# Patient Record
Sex: FEMALE | Race: WHITE | Employment: OTHER | URBAN - METROPOLITAN AREA
[De-identification: names, ages, dates, MRNs, and addresses within clinical notes are randomized per-mention and may not be internally consistent; named-entity substitution may affect disease eponyms.]

---

## 2019-06-05 ENCOUNTER — APPOINTMENT (OUTPATIENT)
Dept: GENERAL RADIOLOGY | Age: 84
End: 2019-06-05
Attending: EMERGENCY MEDICINE
Payer: MEDICARE

## 2019-06-05 ENCOUNTER — HOSPITAL ENCOUNTER (EMERGENCY)
Age: 84
Discharge: HOME | End: 2019-06-05
Attending: EMERGENCY MEDICINE
Payer: MEDICARE

## 2019-06-05 VITALS
HEIGHT: 68 IN | OXYGEN SATURATION: 95 % | HEART RATE: 69 BPM | DIASTOLIC BLOOD PRESSURE: 90 MMHG | SYSTOLIC BLOOD PRESSURE: 178 MMHG | BODY MASS INDEX: 24.25 KG/M2 | WEIGHT: 160 LBS | RESPIRATION RATE: 16 BRPM | TEMPERATURE: 98.7 F

## 2019-06-05 DIAGNOSIS — L03.116 LEFT LEG CELLULITIS: Primary | ICD-10-CM

## 2019-06-05 LAB
ANION GAP SERPL CALC-SCNC: 3 MMOL/L (ref 5–15)
BASOPHILS # BLD: 0.1 K/UL (ref 0–0.1)
BASOPHILS NFR BLD: 1 % (ref 0–1)
BUN SERPL-MCNC: 17 MG/DL (ref 6–20)
BUN/CREAT SERPL: 12 (ref 12–20)
CALCIUM SERPL-MCNC: 8.8 MG/DL (ref 8.5–10.1)
CHLORIDE SERPL-SCNC: 99 MMOL/L (ref 97–108)
CO2 SERPL-SCNC: 31 MMOL/L (ref 21–32)
CREAT SERPL-MCNC: 1.39 MG/DL (ref 0.55–1.02)
DIFFERENTIAL METHOD BLD: NORMAL
EOSINOPHIL # BLD: 0.2 K/UL (ref 0–0.4)
EOSINOPHIL NFR BLD: 3 % (ref 0–7)
ERYTHROCYTE [DISTWIDTH] IN BLOOD BY AUTOMATED COUNT: 12.5 % (ref 11.5–14.5)
GLUCOSE SERPL-MCNC: 91 MG/DL (ref 65–100)
HCT VFR BLD AUTO: 36.7 % (ref 35–47)
HGB BLD-MCNC: 11.9 G/DL (ref 11.5–16)
IMM GRANULOCYTES # BLD AUTO: 0 K/UL (ref 0–0.04)
IMM GRANULOCYTES NFR BLD AUTO: 0 % (ref 0–0.5)
LACTATE BLD-SCNC: 0.59 MMOL/L (ref 0.4–2)
LYMPHOCYTES # BLD: 1.4 K/UL (ref 0.8–3.5)
LYMPHOCYTES NFR BLD: 24 % (ref 12–49)
MCH RBC QN AUTO: 30.3 PG (ref 26–34)
MCHC RBC AUTO-ENTMCNC: 32.4 G/DL (ref 30–36.5)
MCV RBC AUTO: 93.4 FL (ref 80–99)
MONOCYTES # BLD: 0.6 K/UL (ref 0–1)
MONOCYTES NFR BLD: 10 % (ref 5–13)
NEUTS SEG # BLD: 3.5 K/UL (ref 1.8–8)
NEUTS SEG NFR BLD: 62 % (ref 32–75)
NRBC # BLD: 0 K/UL (ref 0–0.01)
NRBC BLD-RTO: 0 PER 100 WBC
PLATELET # BLD AUTO: 226 K/UL (ref 150–400)
PMV BLD AUTO: 9.8 FL (ref 8.9–12.9)
POTASSIUM SERPL-SCNC: 4.1 MMOL/L (ref 3.5–5.1)
RBC # BLD AUTO: 3.93 M/UL (ref 3.8–5.2)
SODIUM SERPL-SCNC: 133 MMOL/L (ref 136–145)
WBC # BLD AUTO: 5.6 K/UL (ref 3.6–11)

## 2019-06-05 PROCEDURE — 96365 THER/PROPH/DIAG IV INF INIT: CPT

## 2019-06-05 PROCEDURE — 80048 BASIC METABOLIC PNL TOTAL CA: CPT

## 2019-06-05 PROCEDURE — 74011250636 HC RX REV CODE- 250/636: Performed by: EMERGENCY MEDICINE

## 2019-06-05 PROCEDURE — 99282 EMERGENCY DEPT VISIT SF MDM: CPT

## 2019-06-05 PROCEDURE — 36415 COLL VENOUS BLD VENIPUNCTURE: CPT

## 2019-06-05 PROCEDURE — 85025 COMPLETE CBC W/AUTO DIFF WBC: CPT

## 2019-06-05 PROCEDURE — 73610 X-RAY EXAM OF ANKLE: CPT

## 2019-06-05 PROCEDURE — 74011250637 HC RX REV CODE- 250/637: Performed by: EMERGENCY MEDICINE

## 2019-06-05 PROCEDURE — 83605 ASSAY OF LACTIC ACID: CPT

## 2019-06-05 RX ORDER — CLINDAMYCIN PHOSPHATE 900 MG/50ML
900 INJECTION, SOLUTION INTRAVENOUS
Status: COMPLETED | OUTPATIENT
Start: 2019-06-05 | End: 2019-06-05

## 2019-06-05 RX ORDER — SODIUM CHLORIDE 0.9 % (FLUSH) 0.9 %
5-40 SYRINGE (ML) INJECTION EVERY 8 HOURS
Status: DISCONTINUED | OUTPATIENT
Start: 2019-06-05 | End: 2019-06-05 | Stop reason: HOSPADM

## 2019-06-05 RX ORDER — CLINDAMYCIN HYDROCHLORIDE 300 MG/1
300 CAPSULE ORAL 3 TIMES DAILY
Qty: 21 CAP | Refills: 0 | Status: SHIPPED | OUTPATIENT
Start: 2019-06-05 | End: 2019-06-12

## 2019-06-05 RX ORDER — ACETAMINOPHEN 500 MG
1000 TABLET ORAL
Status: COMPLETED | OUTPATIENT
Start: 2019-06-05 | End: 2019-06-05

## 2019-06-05 RX ORDER — SODIUM CHLORIDE 0.9 % (FLUSH) 0.9 %
5-40 SYRINGE (ML) INJECTION AS NEEDED
Status: DISCONTINUED | OUTPATIENT
Start: 2019-06-05 | End: 2019-06-05 | Stop reason: HOSPADM

## 2019-06-05 RX ADMIN — ACETAMINOPHEN 1000 MG: 500 TABLET ORAL at 16:43

## 2019-06-05 RX ADMIN — CLINDAMYCIN PHOSPHATE 900 MG: 900 INJECTION, SOLUTION INTRAVENOUS at 16:30

## 2019-06-05 NOTE — ED TRIAGE NOTES
Family reports pt had a cut to left ankle May 20th. Unsure of how the injury happened. Increased pain and redness to foot and leg. On Doxycycline since Tuesday.

## 2019-06-05 NOTE — DISCHARGE INSTRUCTIONS

## 2019-06-05 NOTE — ED PROVIDER NOTES
80 y.o. female with past medical history significant for HTN, and h./o breast CA who presents via private vehicle with chief complaint of left leg pain. Daughter reports that on May 20th (about 16 days ago) pt had an abrasion on her left leg which they cleaned up. Pt was taken to Urgent care immediately, had an unremarkable Xray and was started on prophylactic antibiotics for two days. Then daughter notes pt had a PCP appointment on May 29th (about a week ago) as pt had some redness and swelling by the area of abrasion. Since then pt would elevate her legs and took care of her wound. Two days ago pt started complaining about left leg pain, and daughter reports pt was seen by her PCP again. Pt was started on doxycycline and has taken the medication in the last 1.5 days. Pt states she presents to the ED for continuing left leg pain. Daughter reports pt has a h/o cellulitis. Pt notes she is not UTD on her tetanus vaccination. Pt denies fever, nausea, vomiting, or body aches. There are no other acute medical concerns at this time. Social hx: Former Smoker; Occasional EtOH  PCP: Other, MD Kecia    Note written by Natty Sampson, as dictated by Lawanda Nice MD 3:33 PM      The history is provided by the patient and a relative (daughter). Past Medical History:   Diagnosis Date    Cancer [facility])     beast CA    Endocrine disease     hypothyroid    Hypertension        Past Surgical History:   Procedure Laterality Date    BREAST SURGERY PROCEDURE UNLISTED      right mastectomy         No family history on file.     Social History     Socioeconomic History    Marital status: SINGLE     Spouse name: Not on file    Number of children: Not on file    Years of education: Not on file    Highest education level: Not on file   Occupational History    Not on file   Social Needs    Financial resource strain: Not on file    Food insecurity:     Worry: Not on file     Inability: Not on file   [place] needs: Medical: Not on file     Non-medical: Not on file   Tobacco Use    Smoking status: Former Smoker   Substance and Sexual Activity    Alcohol use: Yes    Drug use: Not on file    Sexual activity: Not on file   Lifestyle    Physical activity:     Days per week: Not on file     Minutes per session: Not on file    Stress: Not on file   Relationships    Social connections:     Talks on phone: Not on file     Gets together: Not on file     Attends Moravian service: Not on file     Active member of club or organization: Not on file     Attends meetings of clubs or organizations: Not on file     Relationship status: Not on file    Intimate partner violence:     Fear of current or ex partner: Not on file     Emotionally abused: Not on file     Physically abused: Not on file     Forced sexual activity: Not on file   Other Topics Concern    Not on file   Social History Narrative    Not on file         ALLERGIES: Sulfa (sulfonamide antibiotics) and Neosporin [hydrocortisone]    Review of Systems   Constitutional: Negative for fever. Gastrointestinal: Negative for nausea and vomiting. Musculoskeletal: Positive for myalgias. Skin: Positive for wound. All other systems reviewed and are negative. Vitals:    06/05/19 1528   BP: 178/90   Pulse: 69   Resp: 16   Temp: 98.7 °F (37.1 °C)   SpO2: 95%   Weight: 72.6 kg (160 lb)   Height: 5' 8\" (1.727 m)            Physical Exam   Constitutional: She appears well-developed and well-nourished. HENT:   Head: Normocephalic and atraumatic. Eyes: Conjunctivae are normal.   Neck: No tracheal deviation present. Cardiovascular: Normal rate. Pulmonary/Chest: Effort normal.   Abdominal: She exhibits no distension. Musculoskeletal:   Deep, slowly healing abrasion noted left lateral ankle region; there is some mild surrounding redness that extends somewhat up to her mid lower leg; the reddened area is tender. NVI. Neurological: She is alert.    Skin: Skin is dry. Psychiatric: She has a normal mood and affect. Nursing note and vitals reviewed. MDM       Procedures    Progress Note:  Results, treatment, and follow up plan have been discussed with patient/daughter. Questions were answered. Maria Guadalupe Jean MD  4:58 PM    A/P: left lower leg cellulitis - she's had a few doses of Doxy without relief; will switch to Clinda; reassuring appearance and exam; VSS; CBC, BMP, lactate, left ankle films ok; home with PCP f/u.   Maria Guadalupe Jean MD  4:59 PM

## 2020-07-17 ENCOUNTER — APPOINTMENT (OUTPATIENT)
Dept: ULTRASOUND IMAGING | Age: 85
DRG: 603 | End: 2020-07-17
Attending: STUDENT IN AN ORGANIZED HEALTH CARE EDUCATION/TRAINING PROGRAM
Payer: MEDICARE

## 2020-07-17 ENCOUNTER — HOSPITAL ENCOUNTER (INPATIENT)
Age: 85
LOS: 5 days | Discharge: HOME HEALTH | DRG: 603 | End: 2020-07-22
Attending: STUDENT IN AN ORGANIZED HEALTH CARE EDUCATION/TRAINING PROGRAM | Admitting: FAMILY MEDICINE
Payer: MEDICARE

## 2020-07-17 DIAGNOSIS — L03.115 CELLULITIS OF RIGHT LOWER EXTREMITY: Primary | ICD-10-CM

## 2020-07-17 PROBLEM — L03.90 CELLULITIS: Status: ACTIVE | Noted: 2020-07-17

## 2020-07-17 LAB
ALBUMIN SERPL-MCNC: 3.1 G/DL (ref 3.5–5)
ALBUMIN/GLOB SERPL: 0.9 {RATIO} (ref 1.1–2.2)
ALP SERPL-CCNC: 61 U/L (ref 45–117)
ALT SERPL-CCNC: 20 U/L (ref 12–78)
ANION GAP SERPL CALC-SCNC: 7 MMOL/L (ref 5–15)
AST SERPL-CCNC: 17 U/L (ref 15–37)
BASOPHILS # BLD: 0 K/UL (ref 0–0.1)
BASOPHILS NFR BLD: 1 % (ref 0–1)
BILIRUB SERPL-MCNC: 0.5 MG/DL (ref 0.2–1)
BUN SERPL-MCNC: 24 MG/DL (ref 6–20)
BUN/CREAT SERPL: 21 (ref 12–20)
CALCIUM SERPL-MCNC: 8.5 MG/DL (ref 8.5–10.1)
CHLORIDE SERPL-SCNC: 96 MMOL/L (ref 97–108)
CO2 SERPL-SCNC: 29 MMOL/L (ref 21–32)
CREAT SERPL-MCNC: 1.15 MG/DL (ref 0.55–1.02)
CRP SERPL-MCNC: 17.78 MG/DL (ref 0–0.6)
DIFFERENTIAL METHOD BLD: ABNORMAL
EOSINOPHIL # BLD: 0.4 K/UL (ref 0–0.4)
EOSINOPHIL NFR BLD: 5 % (ref 0–7)
ERYTHROCYTE [DISTWIDTH] IN BLOOD BY AUTOMATED COUNT: 12.7 % (ref 11.5–14.5)
ERYTHROCYTE [SEDIMENTATION RATE] IN BLOOD: 43 MM/HR (ref 0–30)
GLOBULIN SER CALC-MCNC: 3.4 G/DL (ref 2–4)
GLUCOSE SERPL-MCNC: 95 MG/DL (ref 65–100)
HCT VFR BLD AUTO: 35 % (ref 35–47)
HGB BLD-MCNC: 11.2 G/DL (ref 11.5–16)
IMM GRANULOCYTES # BLD AUTO: 0 K/UL (ref 0–0.04)
IMM GRANULOCYTES NFR BLD AUTO: 1 % (ref 0–0.5)
LYMPHOCYTES # BLD: 0.9 K/UL (ref 0.8–3.5)
LYMPHOCYTES NFR BLD: 11 % (ref 12–49)
MCH RBC QN AUTO: 29.6 PG (ref 26–34)
MCHC RBC AUTO-ENTMCNC: 32 G/DL (ref 30–36.5)
MCV RBC AUTO: 92.3 FL (ref 80–99)
MONOCYTES # BLD: 0.8 K/UL (ref 0–1)
MONOCYTES NFR BLD: 10 % (ref 5–13)
NEUTS SEG # BLD: 6.2 K/UL (ref 1.8–8)
NEUTS SEG NFR BLD: 74 % (ref 32–75)
NRBC # BLD: 0 K/UL (ref 0–0.01)
NRBC BLD-RTO: 0 PER 100 WBC
PLATELET # BLD AUTO: 215 K/UL (ref 150–400)
PMV BLD AUTO: 9.8 FL (ref 8.9–12.9)
POTASSIUM SERPL-SCNC: 4 MMOL/L (ref 3.5–5.1)
PROCALCITONIN SERPL-MCNC: 0.55 NG/ML
PROT SERPL-MCNC: 6.5 G/DL (ref 6.4–8.2)
RBC # BLD AUTO: 3.79 M/UL (ref 3.8–5.2)
SODIUM SERPL-SCNC: 132 MMOL/L (ref 136–145)
WBC # BLD AUTO: 8.4 K/UL (ref 3.6–11)

## 2020-07-17 PROCEDURE — 36415 COLL VENOUS BLD VENIPUNCTURE: CPT

## 2020-07-17 PROCEDURE — 65270000029 HC RM PRIVATE

## 2020-07-17 PROCEDURE — 84145 PROCALCITONIN (PCT): CPT

## 2020-07-17 PROCEDURE — 85652 RBC SED RATE AUTOMATED: CPT

## 2020-07-17 PROCEDURE — 86140 C-REACTIVE PROTEIN: CPT

## 2020-07-17 PROCEDURE — 74011000258 HC RX REV CODE- 258: Performed by: STUDENT IN AN ORGANIZED HEALTH CARE EDUCATION/TRAINING PROGRAM

## 2020-07-17 PROCEDURE — 96368 THER/DIAG CONCURRENT INF: CPT

## 2020-07-17 PROCEDURE — 93971 EXTREMITY STUDY: CPT

## 2020-07-17 PROCEDURE — 85025 COMPLETE CBC W/AUTO DIFF WBC: CPT

## 2020-07-17 PROCEDURE — 96365 THER/PROPH/DIAG IV INF INIT: CPT

## 2020-07-17 PROCEDURE — 99285 EMERGENCY DEPT VISIT HI MDM: CPT

## 2020-07-17 PROCEDURE — 80053 COMPREHEN METABOLIC PANEL: CPT

## 2020-07-17 PROCEDURE — 74011250636 HC RX REV CODE- 250/636: Performed by: STUDENT IN AN ORGANIZED HEALTH CARE EDUCATION/TRAINING PROGRAM

## 2020-07-17 RX ORDER — VANCOMYCIN/0.9 % SOD CHLORIDE 1.5G/250ML
1500 PLASTIC BAG, INJECTION (ML) INTRAVENOUS ONCE
Status: DISCONTINUED | OUTPATIENT
Start: 2020-07-17 | End: 2020-07-17 | Stop reason: SDUPTHER

## 2020-07-17 RX ORDER — GLUCOSAMINE SULFATE 1500 MG
POWDER IN PACKET (EA) ORAL DAILY
COMMUNITY

## 2020-07-17 RX ORDER — METOPROLOL TARTRATE 25 MG/1
25 TABLET, FILM COATED ORAL 2 TIMES DAILY
COMMUNITY

## 2020-07-17 RX ORDER — SODIUM CHLORIDE 0.9 % (FLUSH) 0.9 %
5-40 SYRINGE (ML) INJECTION EVERY 8 HOURS
Status: DISCONTINUED | OUTPATIENT
Start: 2020-07-17 | End: 2020-07-22 | Stop reason: HOSPADM

## 2020-07-17 RX ORDER — SODIUM CHLORIDE 0.9 % (FLUSH) 0.9 %
5-40 SYRINGE (ML) INJECTION AS NEEDED
Status: DISCONTINUED | OUTPATIENT
Start: 2020-07-17 | End: 2020-07-22 | Stop reason: HOSPADM

## 2020-07-17 RX ORDER — RISPERIDONE 0.25 MG/1
0.25 TABLET, FILM COATED ORAL 2 TIMES DAILY
COMMUNITY

## 2020-07-17 RX ORDER — ESCITALOPRAM OXALATE 20 MG/1
20 TABLET ORAL DAILY
COMMUNITY
End: 2021-03-15

## 2020-07-17 RX ADMIN — VANCOMYCIN HYDROCHLORIDE 500 MG: 500 INJECTION, POWDER, LYOPHILIZED, FOR SOLUTION INTRAVENOUS at 19:08

## 2020-07-17 RX ADMIN — VANCOMYCIN HYDROCHLORIDE 1000 MG: 1 INJECTION, POWDER, LYOPHILIZED, FOR SOLUTION INTRAVENOUS at 19:09

## 2020-07-17 RX ADMIN — Medication 10 ML: at 22:00

## 2020-07-17 NOTE — ED PROVIDER NOTES
Cate Wall is a 80 y.o. female with past medical history notable for breast cancer, hypothyroidism, hypertension presenting with persistent erythema, warmth and swelling of the left lower extremity despite multiple courses of antibiotics. 5/27/2020 she started Keflex 500 mg tabs for a total of 7 days, on 6/5/2020 and she started doxycycline 100 mg twice daily for 7 days. She started a prednisone taper on 6/23/2020. Despite this she has had persistent swelling in lower extremity. She also has some discomfort although does not severe. She denies abdominal pain, vomiting, groin pain, subjective fever. She was transferred from her nursing facility for evaluation and possible admission. Past Medical History:   Diagnosis Date    Cancer [facility])     beast CA    Endocrine disease     hypothyroid    Hypertension        Past Surgical History:   Procedure Laterality Date    BREAST SURGERY PROCEDURE UNLISTED      right mastectomy         History reviewed. No pertinent family history.     Social History     Socioeconomic History    Marital status: SINGLE     Spouse name: Not on file    Number of children: Not on file    Years of education: Not on file    Highest education level: Not on file   Occupational History    Not on file   Social Needs    Financial resource strain: Not on file    Food insecurity     Worry: Not on file     Inability: Not on file    Transportation needs     Medical: Not on file     Non-medical: Not on file   Tobacco Use    Smoking status: Former Smoker   Substance and Sexual Activity    Alcohol use: Not Currently    Drug use: Never    Sexual activity: Not on file   Lifestyle    Physical activity     Days per week: Not on file     Minutes per session: Not on file    Stress: Not on file   Relationships    Social connections     Talks on phone: Not on file     Gets together: Not on file     Attends Zoroastrianism service: Not on file     Active member of club or organization: Not on file     Attends meetings of clubs or organizations: Not on file     Relationship status: Not on file    Intimate partner violence     Fear of current or ex partner: Not on file     Emotionally abused: Not on file     Physically abused: Not on file     Forced sexual activity: Not on file   Other Topics Concern    Not on file   Social History Narrative    Not on file         ALLERGIES: Sulfa (sulfonamide antibiotics) and Neosporin [hydrocortisone]    Review of Systems   Constitutional: Negative for chills and fever. Eyes: Negative for photophobia. Respiratory: Negative for cough and shortness of breath. Cardiovascular: Positive for leg swelling. Negative for chest pain. Gastrointestinal: Negative for abdominal pain and nausea. Genitourinary: Negative for dysuria. Musculoskeletal: Negative for back pain. Skin: Positive for rash. Neurological: Negative for light-headedness and headaches. Psychiatric/Behavioral: Negative for confusion. All other systems reviewed and are negative. Vitals:    07/17/20 1801 07/17/20 1815 07/17/20 1830 07/17/20 1831   BP:  132/63     Resp:       Temp: 98.5 °F (36.9 °C)      SpO2:  97%     Weight:   72.6 kg (160 lb 0.9 oz)    Height:   5' 3\" (1.6 m) 5' 3\" (1.6 m)            Physical Exam  Vitals signs reviewed. Constitutional:       General: She is not in acute distress. Appearance: She is not toxic-appearing. HENT:      Head: Normocephalic and atraumatic. Mouth/Throat:      Mouth: Mucous membranes are moist.      Pharynx: Oropharynx is clear. Cardiovascular:      Rate and Rhythm: Normal rate and regular rhythm. Heart sounds: Normal heart sounds. Pulmonary:      Effort: Pulmonary effort is normal.      Breath sounds: Normal breath sounds. Abdominal:      Tenderness: There is no abdominal tenderness. There is no guarding or rebound. Musculoskeletal: Normal range of motion. Right lower leg: Edema present.       Comments: 2+ pedal pulses bilaterally, cap refill less than 2 bilateral lower extremities. Skin:     Capillary Refill: Capillary refill takes less than 2 seconds. Findings: Erythema present. Neurological:      General: No focal deficit present. Mental Status: She is alert and oriented to person, place, and time. Psychiatric:         Mood and Affect: Mood normal.          MDM  Number of Diagnoses or Management Options  Cellulitis of right lower extremity:        Chief Complaint   Patient presents with    Skin Problem     Hospitalist Jeancarlos Payne for Admission  7:24 PM    ED Room Number: WER04/04  Patient Name and age:  Jose Setting 80 y.o.  female  Working Diagnosis:   1. Cellulitis of right lower extremity        COVID-19 Suspicion:  no    Code Status:  No code status specified but  Power of  documents are included. Readmission: no  Isolation Requirements:  no  Recommended Level of Care:  med/surg  Department:[place] [facility] - [phone]  Other:      Jose Pedroza is a 80 y.o. female presenting with leg swelling, discomfort. Differential includes cellulitis, less likely venous stasis or DVT. No evidence of sepsis, inflammatory markers are however elevated. Will start on IV vancomycin, plan to admit given multiple courses of oral antibiotics without improvement.   MEDICATIONS GIVEN:  Medications   vancomycin (VANCOCIN) 1,000 mg in 0.9% sodium chloride (MBP/ADV) 250 mL (1,000 mg IntraVENous New Bag 7/17/20 1909)     And   vancomycin (VANCOCIN) 500 mg in 0.9% sodium chloride (MBP/ADV) 100 mL (500 mg IntraVENous New Bag 7/17/20 1908)       LABS REVIEWED:  Labs Reviewed   CBC WITH AUTOMATED DIFF - Abnormal; Notable for the following components:       Result Value    RBC 3.79 (*)     HGB 11.2 (*)     LYMPHOCYTES 11 (*)     IMMATURE GRANULOCYTES 1 (*)     All other components within normal limits   METABOLIC PANEL, COMPREHENSIVE - Abnormal; Notable for the following components:    Sodium 132 (*) Chloride 96 (*)     BUN 24 (*)     Creatinine 1.15 (*)     BUN/Creatinine ratio 21 (*)     GFR est AA 54 (*)     GFR est non-AA 45 (*)     Albumin 3.1 (*)     A-G Ratio 0.9 (*)     All other components within normal limits   SED RATE (ESR) - Abnormal; Notable for the following components:    Sed rate, automated 43 (*)     All other components within normal limits   C REACTIVE PROTEIN, QT - Abnormal; Notable for the following components:    C-Reactive protein 17.78 (*)     All other components within normal limits   PROCALCITONIN       VITAL SIGNS:  Vitals:    07/17/20 1801 07/17/20 1815 07/17/20 1830 07/17/20 1831   BP:  132/63     Resp:       Temp: 98.5 °F (36.9 °C)      SpO2:  97%     Weight:   72.6 kg (160 lb 0.9 oz)    Height:   5' 3\" (1.6 m) 5' 3\" (1.6 m)        RADIOLOGY RESULTS:  No orders to display       CLINICAL IMPRESSION:  1.  Cellulitis of right lower extremity        DISPO:  Admitted    FOLLOW UP PLAN:  Follow-up Information    None          ED COURSE:         Procedures

## 2020-07-17 NOTE — ED TRIAGE NOTES
Pt arrives via [facility]. Report of \"pt resides at the [facility] staff concerned about possible cellulitis right leg. \" Pt appears in no distress at this time.

## 2020-07-17 NOTE — ED NOTES
Patient is resting comfortably on the stretcher, covered with blankets. IV Vancomycin running per IV pumps, and tolerating well. Awaiting bed assignment at [facility].

## 2020-07-17 NOTE — ED NOTES
Son is [place], [place], he wants to be called from the admitting nurse at [facility] when patient arrives to assigned room.

## 2020-07-17 NOTE — ED NOTES
Pts son updated on pts statis and current plan to admit to [facility]. Pts son verbalized good understanding and agreement with plan.

## 2020-07-17 NOTE — ED NOTES
Bedside shift change report given to Rebekah Tamayo RN (oncoming nurse) by Roseline Hilliard. Salome Arroyo RN (offgoing nurse). Report included the following information SBAR.

## 2020-07-18 LAB
ALBUMIN SERPL-MCNC: 2.5 G/DL (ref 3.5–5)
ALBUMIN/GLOB SERPL: 0.9 {RATIO} (ref 1.1–2.2)
ALP SERPL-CCNC: 51 U/L (ref 45–117)
ALT SERPL-CCNC: 14 U/L (ref 12–78)
ANION GAP SERPL CALC-SCNC: 5 MMOL/L (ref 5–15)
AST SERPL-CCNC: 11 U/L (ref 15–37)
BILIRUB SERPL-MCNC: 0.6 MG/DL (ref 0.2–1)
BUN SERPL-MCNC: 18 MG/DL (ref 6–20)
BUN/CREAT SERPL: 23 (ref 12–20)
CALCIUM SERPL-MCNC: 7.7 MG/DL (ref 8.5–10.1)
CHLORIDE SERPL-SCNC: 102 MMOL/L (ref 97–108)
CO2 SERPL-SCNC: 27 MMOL/L (ref 21–32)
COMMENT, HOLDF: NORMAL
CREAT SERPL-MCNC: 0.8 MG/DL (ref 0.55–1.02)
ERYTHROCYTE [DISTWIDTH] IN BLOOD BY AUTOMATED COUNT: 12.6 % (ref 11.5–14.5)
GLOBULIN SER CALC-MCNC: 2.8 G/DL (ref 2–4)
GLUCOSE SERPL-MCNC: 88 MG/DL (ref 65–100)
HCT VFR BLD AUTO: 31.2 % (ref 35–47)
HGB BLD-MCNC: 10.1 G/DL (ref 11.5–16)
MCH RBC QN AUTO: 29.6 PG (ref 26–34)
MCHC RBC AUTO-ENTMCNC: 32.4 G/DL (ref 30–36.5)
MCV RBC AUTO: 91.5 FL (ref 80–99)
NRBC # BLD: 0 K/UL (ref 0–0.01)
NRBC BLD-RTO: 0 PER 100 WBC
PLATELET # BLD AUTO: 182 K/UL (ref 150–400)
PMV BLD AUTO: 9.4 FL (ref 8.9–12.9)
POTASSIUM SERPL-SCNC: 3.8 MMOL/L (ref 3.5–5.1)
PROT SERPL-MCNC: 5.3 G/DL (ref 6.4–8.2)
RBC # BLD AUTO: 3.41 M/UL (ref 3.8–5.2)
SAMPLES BEING HELD,HOLD: NORMAL
SODIUM SERPL-SCNC: 134 MMOL/L (ref 136–145)
WBC # BLD AUTO: 5.6 K/UL (ref 3.6–11)

## 2020-07-18 PROCEDURE — 80053 COMPREHEN METABOLIC PANEL: CPT

## 2020-07-18 PROCEDURE — 74011250636 HC RX REV CODE- 250/636: Performed by: INTERNAL MEDICINE

## 2020-07-18 PROCEDURE — 36415 COLL VENOUS BLD VENIPUNCTURE: CPT

## 2020-07-18 PROCEDURE — 74011000258 HC RX REV CODE- 258: Performed by: INTERNAL MEDICINE

## 2020-07-18 PROCEDURE — 65270000029 HC RM PRIVATE

## 2020-07-18 PROCEDURE — 74011250636 HC RX REV CODE- 250/636: Performed by: FAMILY MEDICINE

## 2020-07-18 PROCEDURE — 77030038269 HC DRN EXT URIN PURWCK BARD -A

## 2020-07-18 PROCEDURE — 74011250637 HC RX REV CODE- 250/637: Performed by: FAMILY MEDICINE

## 2020-07-18 PROCEDURE — 85027 COMPLETE CBC AUTOMATED: CPT

## 2020-07-18 RX ORDER — SODIUM CHLORIDE 9 MG/ML
75 INJECTION, SOLUTION INTRAVENOUS CONTINUOUS
Status: DISCONTINUED | OUTPATIENT
Start: 2020-07-18 | End: 2020-07-19

## 2020-07-18 RX ORDER — SODIUM CHLORIDE 0.9 % (FLUSH) 0.9 %
5-40 SYRINGE (ML) INJECTION EVERY 8 HOURS
Status: DISCONTINUED | OUTPATIENT
Start: 2020-07-18 | End: 2020-07-22 | Stop reason: HOSPADM

## 2020-07-18 RX ORDER — ACETAMINOPHEN 650 MG/1
650 SUPPOSITORY RECTAL
Status: DISCONTINUED | OUTPATIENT
Start: 2020-07-18 | End: 2020-07-22 | Stop reason: HOSPADM

## 2020-07-18 RX ORDER — POLYETHYLENE GLYCOL 3350 17 G/17G
17 POWDER, FOR SOLUTION ORAL DAILY PRN
Status: DISCONTINUED | OUTPATIENT
Start: 2020-07-18 | End: 2020-07-22 | Stop reason: HOSPADM

## 2020-07-18 RX ORDER — METOPROLOL TARTRATE 25 MG/1
25 TABLET, FILM COATED ORAL 2 TIMES DAILY
Status: DISCONTINUED | OUTPATIENT
Start: 2020-07-18 | End: 2020-07-22 | Stop reason: HOSPADM

## 2020-07-18 RX ORDER — SODIUM CHLORIDE 0.9 % (FLUSH) 0.9 %
5-40 SYRINGE (ML) INJECTION AS NEEDED
Status: DISCONTINUED | OUTPATIENT
Start: 2020-07-18 | End: 2020-07-22 | Stop reason: HOSPADM

## 2020-07-18 RX ORDER — ENOXAPARIN SODIUM 100 MG/ML
40 INJECTION SUBCUTANEOUS DAILY
Status: DISCONTINUED | OUTPATIENT
Start: 2020-07-18 | End: 2020-07-22 | Stop reason: HOSPADM

## 2020-07-18 RX ORDER — ONDANSETRON 2 MG/ML
4 INJECTION INTRAMUSCULAR; INTRAVENOUS
Status: DISCONTINUED | OUTPATIENT
Start: 2020-07-18 | End: 2020-07-22 | Stop reason: HOSPADM

## 2020-07-18 RX ORDER — ESCITALOPRAM OXALATE 10 MG/1
20 TABLET ORAL DAILY
Status: DISCONTINUED | OUTPATIENT
Start: 2020-07-18 | End: 2020-07-22 | Stop reason: HOSPADM

## 2020-07-18 RX ORDER — PROMETHAZINE HYDROCHLORIDE 25 MG/1
12.5 TABLET ORAL
Status: DISCONTINUED | OUTPATIENT
Start: 2020-07-18 | End: 2020-07-22 | Stop reason: HOSPADM

## 2020-07-18 RX ORDER — ACETAMINOPHEN 325 MG/1
650 TABLET ORAL
Status: DISCONTINUED | OUTPATIENT
Start: 2020-07-18 | End: 2020-07-22 | Stop reason: HOSPADM

## 2020-07-18 RX ORDER — LEVOTHYROXINE SODIUM 50 UG/1
50 TABLET ORAL
Status: DISCONTINUED | OUTPATIENT
Start: 2020-07-18 | End: 2020-07-22 | Stop reason: HOSPADM

## 2020-07-18 RX ORDER — AMLODIPINE BESYLATE 5 MG/1
5 TABLET ORAL DAILY
Status: DISCONTINUED | OUTPATIENT
Start: 2020-07-18 | End: 2020-07-22 | Stop reason: HOSPADM

## 2020-07-18 RX ADMIN — SODIUM CHLORIDE 75 ML/HR: 900 INJECTION, SOLUTION INTRAVENOUS at 02:29

## 2020-07-18 RX ADMIN — LEVOTHYROXINE SODIUM 50 MCG: 0.05 TABLET ORAL at 06:18

## 2020-07-18 RX ADMIN — CEFEPIME HYDROCHLORIDE 2 G: 2 INJECTION, POWDER, FOR SOLUTION INTRAVENOUS at 22:07

## 2020-07-18 RX ADMIN — METOPROLOL TARTRATE 25 MG: 25 TABLET, FILM COATED ORAL at 09:11

## 2020-07-18 RX ADMIN — Medication 10 ML: at 14:00

## 2020-07-18 RX ADMIN — Medication 10 ML: at 02:00

## 2020-07-18 RX ADMIN — CEFEPIME HYDROCHLORIDE 2 G: 2 INJECTION, POWDER, FOR SOLUTION INTRAVENOUS at 12:30

## 2020-07-18 RX ADMIN — ENOXAPARIN SODIUM 40 MG: 40 INJECTION SUBCUTANEOUS at 09:11

## 2020-07-18 RX ADMIN — Medication 10 ML: at 06:00

## 2020-07-18 RX ADMIN — Medication 10 ML: at 22:07

## 2020-07-18 RX ADMIN — AMLODIPINE BESYLATE 5 MG: 5 TABLET ORAL at 09:11

## 2020-07-18 RX ADMIN — ESCITALOPRAM OXALATE 20 MG: 10 TABLET ORAL at 09:11

## 2020-07-18 RX ADMIN — METOPROLOL TARTRATE 25 MG: 25 TABLET, FILM COATED ORAL at 18:13

## 2020-07-18 RX ADMIN — VANCOMYCIN HYDROCHLORIDE 1250 MG: 1.25 INJECTION, POWDER, LYOPHILIZED, FOR SOLUTION INTRAVENOUS at 18:13

## 2020-07-18 NOTE — H&P
[address] Adult  Hospitalist Group    History & Physical    Date of service: 7/17/2020    Patient name: Cuca Soto  MRN: [id number]  YOB: 1934  Age: 80 y.o. Primary care provider:  Kecia Sotelo MD     Source of Information: patient, medical records                             Chief complain: Right lower extremity swelling    History of present illness  Cuca Soto is a 80 y.o. female who presented with right lower extremity swelling. The patient has dementia and is a poor historian, information was obtained from the patient records. The patient has been having persistent erythema, warmth, and swelling of the right lower extremity. She has received multiple courses of antibiotics including Keflex and doxycycline. She is also been having fevers. The patient denies any pain in her legs currently. Past Medical History:   Diagnosis Date    Cancer [facility])     beast CA    Endocrine disease     hypothyroid    Hypertension       Past Surgical History:   Procedure Laterality Date    BREAST SURGERY PROCEDURE UNLISTED      right mastectomy     Prior to Admission medications    Medication Sig Start Date End Date Taking? Authorizing Provider   escitalopram oxalate (LEXAPRO) 20 mg tablet Take 20 mg by mouth daily. Yes Deepak, MD Kecia   metoprolol tartrate (LOPRESSOR) 25 mg tablet Take 25 mg by mouth two (2) times a day. Yes Kecia Sotelo MD   risperiDONE (RisperDAL) 0.25 mg tablet Take 0.25 mg by mouth. Yes Kecia Sotelo MD   cholecalciferol (Vitamin D3) 25 mcg (1,000 unit) cap Take  by mouth daily. Yes Kecia Soetlo MD   levothyroxine (SYNTHROID) 25 mcg tablet Take 50 mcg by mouth Daily (before breakfast). Yes Deepak, MD Kecia   amLODIPine (NORVASC) 5 mg tablet Take 5 mg by mouth daily. Yes Kecia Sotelo MD     Allergies   Allergen Reactions    Sulfa (Sulfonamide Antibiotics) Anaphylaxis    Neosporin [Hydrocortisone] Hives      History reviewed. No pertinent family history. Social history    Social History     Tobacco Use   Smoking Status Former Smoker   Smokeless Tobacco Never Used       Social History     Substance and Sexual Activity   Alcohol Use Not Currently       Code status  Code status discussed with the patient/caregivers. Full Code    Review of systems    A comprehensive review of systems was negative except for that written in the History of Present Illness. Physical Examination   Visit Vitals  /78 (BP 1 Location: Right arm, BP Patient Position: At rest)   Pulse 76   Temp (!) 100.6 °F (38.1 °C)   Resp 17   Ht 5' 3\" (1.6 m)   Wt 72.6 kg (160 lb 0.9 oz)   SpO2 95%   BMI 28.35 kg/m²          O2 Device: Room air    General:  Alert, cooperative, no distress   Head:  Normocephalic, without obvious abnormality, atraumatic   Eyes:  Conjunctivae/corneas clear. PERRL, EOMs intact   Head/Neck: Nares normal. No nasal drainage or sinus tenderness  Lips, mucosa, and tongue normal   Teeth and gums normal  Clear oropharynx  Trachea midline  No palpable adenopathy  No thyroid enlargement, tenderness     Lungs:   Symmetrical chest expansion and respiratory effort  Clear to auscultation bilaterally       Heart:  Regular rhythm   Sounds normal; no murmur, rub or gallop   Abdomen:   Soft, no tenderness  Bowel sounds normal  No masses or hepatosplenomegaly     Back: No CVA tenderness   Extremities: Extremities normal, atraumatic  No cyanosis or edema     Skin:  Redness, warmth, and swelling of the right lower extremity that extends up to the mid calf.    Musculo-      skeletal: Gait not tested  Normal symmetry, ROM, strength and tone   Neuro: Cranial nerves grossly normal     Psych: Alert, oriented x3  Normal affect, judgement and insight     Data Review    24 Hour Results:  Recent Results (from the past 24 hour(s))   CBC WITH AUTOMATED DIFF    Collection Time: 07/17/20  5:56 PM   Result Value Ref Range    WBC 8.4 3.6 - 11.0 K/uL    RBC 3.79 (L) 3.80 - 5.20 M/uL    HGB 11.2 (L) 11.5 - 16.0 g/dL    HCT 35.0 35.0 - 47.0 %    MCV 92.3 80.0 - 99.0 FL    MCH 29.6 26.0 - 34.0 PG    MCHC 32.0 30.0 - 36.5 g/dL    RDW 12.7 11.5 - 14.5 %    PLATELET 968 811 - 815 K/uL    MPV 9.8 8.9 - 12.9 FL    NRBC 0.0 0.0  WBC    ABSOLUTE NRBC 0.00 0.00 - 0.01 K/uL    NEUTROPHILS 74 32 - 75 %    LYMPHOCYTES 11 (L) 12 - 49 %    MONOCYTES 10 5 - 13 %    EOSINOPHILS 5 0 - 7 %    BASOPHILS 1 0 - 1 %    IMMATURE GRANULOCYTES 1 (H) 0 - 0.5 %    ABS. NEUTROPHILS 6.2 1.8 - 8.0 K/UL    ABS. LYMPHOCYTES 0.9 0.8 - 3.5 K/UL    ABS. MONOCYTES 0.8 0.0 - 1.0 K/UL    ABS. EOSINOPHILS 0.4 0.0 - 0.4 K/UL    ABS. BASOPHILS 0.0 0.0 - 0.1 K/UL    ABS. IMM. GRANS. 0.0 0.00 - 0.04 K/UL    DF AUTOMATED     METABOLIC PANEL, COMPREHENSIVE    Collection Time: 07/17/20  5:56 PM   Result Value Ref Range    Sodium 132 (L) 136 - 145 mmol/L    Potassium 4.0 3.5 - 5.1 mmol/L    Chloride 96 (L) 97 - 108 mmol/L    CO2 29 21 - 32 mmol/L    Anion gap 7 5 - 15 mmol/L    Glucose 95 65 - 100 mg/dL    BUN 24 (H) 6 - 20 MG/DL    Creatinine 1.15 (H) 0.55 - 1.02 MG/DL    BUN/Creatinine ratio 21 (H) 12 - 20      GFR est AA 54 (L) >60 ml/min/1.73m2    GFR est non-AA 45 (L) >60 ml/min/1.73m2    Calcium 8.5 8.5 - 10.1 MG/DL    Bilirubin, total 0.5 0.2 - 1.0 MG/DL    ALT (SGPT) 20 12 - 78 U/L    AST (SGOT) 17 15 - 37 U/L    Alk.  phosphatase 61 45 - 117 U/L    Protein, total 6.5 6.4 - 8.2 g/dL    Albumin 3.1 (L) 3.5 - 5.0 g/dL    Globulin 3.4 2.0 - 4.0 g/dL    A-G Ratio 0.9 (L) 1.1 - 2.2     SED RATE (ESR)    Collection Time: 07/17/20  5:56 PM   Result Value Ref Range    Sed rate, automated 43 (H) 0 - 30 mm/hr   C REACTIVE PROTEIN, QT    Collection Time: 07/17/20  5:56 PM   Result Value Ref Range    C-Reactive protein 17.78 (H) 0.00 - 0.60 mg/dL   PROCALCITONIN    Collection Time: 07/17/20  5:56 PM   Result Value Ref Range    Procalcitonin 0.55 ng/mL     Recent Labs     07/17/20  1756   WBC 8.4   HGB 11.2*   HCT 35.0        Recent Labs 07/17/20  1756   *   K 4.0   CL 96*   CO2 29   GLU 95   BUN 24*   CREA 1.15*   CA 8.5   ALB 3.1*   TBILI 0.5   ALT 20       Imaging  No results found. Assessment and Plan     Cellulitis of right lower extremity  -Failed multiple outpatient treatments  -We will continue with vancomycin  -Duplex lower extremity ultrasound negative for DVT    Dementia  -Patient resides at memory care unit in [facility]    Hypothyroidism  -Continue with levothyroxine    Hypertension  -Continue with amlodipine and metoprolol    Depression and anxiety  -Continue with Lexapro        Diet: Regular  Activity: As tolerated with assistance  DVT prophylaxis: Lovenox  Isolation precautions: None       Signed by:  Parul Fernandez MD    July 18, 2020 at 1:33 AM

## 2020-07-18 NOTE — PROGRESS NOTES
Bedside shift change report given to ANDRE Medrano (oncoming nurse) by Josse Coats RN (offgoing nurse). Report included the following information SBAR, Kardex, MAR, Accordion and Recent Results.

## 2020-07-18 NOTE — PROGRESS NOTES
0030-patient arrived via [facility] from [facility]. I tried to contact the son, Pineda Ruffin, and the mailbox was full and no answer. Patient's daughter, Kera Moctezuma, was also called to obtain admission information. Patient is only A&Ox1-2 and cannot provide much information in regards to her health.

## 2020-07-18 NOTE — PROGRESS NOTES
[facility] Dosing Services: Antimicrobial Stewardship Progress Note    Consult for antibiotic dosing of Vancomycin by Dr. Joanne Rajan  Pharmacist reviewed antibiotic appropriateness for 80year old , female  for indication of cellulitis, failed multiple outpatient abx  Day of Therapy 1    Plan:  Vancomycin therapy:  Start Vancomycin therapy, with loading dose of 1500 (mg) at 1900 07/17/20. Follow with maintenance dose of : by random level, re-assess after am labs. (frequency). Update at 0513: crcl improved to 49 ml/min, will order 1250 mg IV q24H to begin at 1900 07/18/20. Dose calculated to approximate a therapeutic trough of 10-15 mcg/mL. Last trough level / Plan for level:   Pharmacy to follow daily and will make changes to dose and/or frequency based on clinical status. Date Dose & Interval Measured (mcg/mL) Extrapolated (mcg/mL)   ? ? ? ?   ? ? ? ?   ? ? ? ? Non-Kinetic Antimicrobial Dosing:   Current Regimen:    Recommendation:   Other Antimicrobial  (not dosed by pharmacist)      Cultures        Serum Creatinine     Lab Results   Component Value Date/Time    Creatinine 1.15 (H) 07/17/2020 05:56 PM       Creatinine Clearance Estimated Creatinine Clearance: 34.2 mL/min (A) (based on SCr of 1.15 mg/dL (H)).      Procalcitonin    Lab Results   Component Value Date/Time    Procalcitonin 0.55 07/17/2020 05:56 PM        Temp   (!) 100.6 °F (38.1 °C)    WBC   Lab Results   Component Value Date/Time    WBC 8.4 07/17/2020 05:56 PM       H/H   Lab Results   Component Value Date/Time    HGB 11.2 (L) 07/17/2020 05:56 PM      Platelets Lab Results   Component Value Date/Time    PLATELET 571 63/15/2221 05:56 PM            Pharmacist: Erinn information: [phone]

## 2020-07-18 NOTE — ED NOTES
TRANSFER - OUT REPORT:    Verbal report given to Izabel HEIDI EMT-B(name) on [address]  being transferred to [place] room 503(unit) for routine progression of care       Report consisted of patients Situation, Background, Assessment and   Recommendations(SBAR). Information from the following report(s) SBAR, Kardex, [facility] and Recent Results was reviewed with the receiving nurse. Lines:   Peripheral IV 07/17/20 Left Forearm (Active)   Site Assessment Clean, dry, & intact 07/17/20 1759   Phlebitis Assessment 0 07/17/20 1759   Infiltration Assessment 0 07/17/20 1759   Dressing Status Clean, dry, & intact 07/17/20 1759   Dressing Type Transparent 07/17/20 1759   Hub Color/Line Status Pink 07/17/20 1759       Peripheral IV 07/17/20 Left Antecubital (Active)   Site Assessment Clean, dry, & intact 07/17/20 1800   Phlebitis Assessment 0 07/17/20 1800   Infiltration Assessment 0 07/17/20 1800   Dressing Status Clean, dry, & intact 07/17/20 1800   Dressing Type Transparent 07/17/20 1800   Hub Color/Line Status Blue 07/17/20 1800        Opportunity for questions and clarification was provided.

## 2020-07-18 NOTE — ED NOTES
Time out completed with Sefton EMT-B for correct patient going to [facility] room 503. All of patient's personal items taken at transport. EMTALA signed by all parties and sent with patient at transport.

## 2020-07-18 NOTE — ED NOTES
TRANSFER - OUT REPORT:    Verbal report given to darling nguyễn rn (name) on [address]  being transferred to [facility] (unit) for routine progression of care       Report consisted of patients Situation, Background, Assessment and   Recommendations(SBAR). Information from the following report(s) SBAR was reviewed with the receiving nurse. Lines:   Peripheral IV 07/17/20 Left Forearm (Active)   Site Assessment Clean, dry, & intact 07/17/20 1759   Phlebitis Assessment 0 07/17/20 1759   Infiltration Assessment 0 07/17/20 1759   Dressing Status Clean, dry, & intact 07/17/20 1759   Dressing Type Transparent 07/17/20 1759   Hub Color/Line Status Pink 07/17/20 1759       Peripheral IV 07/17/20 Left Antecubital (Active)   Site Assessment Clean, dry, & intact 07/17/20 1800   Phlebitis Assessment 0 07/17/20 1800   Infiltration Assessment 0 07/17/20 1800   Dressing Status Clean, dry, & intact 07/17/20 1800   Dressing Type Transparent 07/17/20 1800   Hub Color/Line Status Blue 07/17/20 1800        Opportunity for questions and clarification was provided.       Patient transported with:   Monitor

## 2020-07-18 NOTE — ED NOTES
Patient repositioned for comfort. Very pleasant at this time, does talk to a person in the room, that is not there. She also sees a colored disco ball light at times.

## 2020-07-18 NOTE — ED NOTES
Verbal shift change report given to Cathie Allred RN  (oncoming nurse) by Alex Scherer RN (offgoing nurse). Report included the following information SBAR, Kardex, MAR and Recent Results.

## 2020-07-18 NOTE — PROGRESS NOTES
[facility]  [address] [facility], [address] [place] [address]  [phone]      Medical Progress Note      NAME: Aiyana Brown   :  1934  MRM:  [id number]    Date of service: 2020  10:08 AM       Assessment and Plan:   1. Cellulitis of right lower extremity. Failed outpatient ABx treatment. On vancomycin and will add gram negative coverage as well. Duplex lower extremity ultrasound negative for DVT. Check MRSA nasal swab and wound culture     2. Hypertension. Continue amlodipine and metoprolol    3. Hypothyroidism. Continue levothyroxine     4. Dementia- supportive care. Patient resides at memory care unit in [facility]     5. Depression and anxiety-Continue with Lexapro              Subjective:     Chief Complaint[de-identified] Patient was seen and examined as a follow up for cellulitis. Chart was reviewed. RT leg pain     ROS:  (bold if positive, if negative)    Tolerating PT  Tolerating Diet        Objective:     Last 24hrs VS reviewed since prior progress note.  Most recent are:    Visit Vitals  /76 (BP 1 Location: Right arm, BP Patient Position: At rest)   Pulse 72   Temp 98.7 °F (37.1 °C)   Resp 18   Ht 5' 3\" (1.6 m)   Wt 72.6 kg (160 lb 0.9 oz)   SpO2 95%   BMI 28.35 kg/m²     SpO2 Readings from Last 6 Encounters:   20 95%   19 95%   13 97%            Intake/Output Summary (Last 24 hours) at 2020 1008  Last data filed at 2020 0650  Gross per 24 hour   Intake 676.25 ml   Output    Net 676.25 ml        Physical Exam:    Gen:  Well-developed, well-nourished, in no acute distress  HEENT:  Pink conjunctivae, PERRL, hearing intact to voice, moist mucous membranes  Neck:  Supple, without masses, thyroid non-tender  Resp:  No accessory muscle use, clear breath sounds without wheezes rales or rhonchi  Card:  No murmurs, normal S1, S2 without thrills, bruits or peripheral edema  Abd:  Soft, non-tender, non-distended, normoactive bowel sounds are present, no palpable organomegaly and no detectable hernias  Lymph:  No cervical or inguinal adenopathy  Musc:  No cyanosis or clubbing  Skin:  RT leg oozing, hyperemic.    Neuro:  Cranial nerves are grossly intact, no focal motor weakness, follows commands appropriately  Psych:  poor insight,   __________________________________________________________________  Medications Reviewed: (see below)  Medications:     Current Facility-Administered Medications   Medication Dose Route Frequency    sodium chloride (NS) flush 5-40 mL  5-40 mL IntraVENous Q8H    sodium chloride (NS) flush 5-40 mL  5-40 mL IntraVENous PRN    acetaminophen (TYLENOL) tablet 650 mg  650 mg Oral Q6H PRN    Or    acetaminophen (TYLENOL) suppository 650 mg  650 mg Rectal Q6H PRN    polyethylene glycol (MIRALAX) packet 17 g  17 g Oral DAILY PRN    promethazine (PHENERGAN) tablet 12.5 mg  12.5 mg Oral Q6H PRN    Or    ondansetron (ZOFRAN) injection 4 mg  4 mg IntraVENous Q6H PRN    enoxaparin (LOVENOX) injection 40 mg  40 mg SubCUTAneous DAILY    0.9% sodium chloride infusion  75 mL/hr IntraVENous CONTINUOUS    amLODIPine (NORVASC) tablet 5 mg  5 mg Oral DAILY    escitalopram oxalate (LEXAPRO) tablet 20 mg  20 mg Oral DAILY    levothyroxine (SYNTHROID) tablet 50 mcg  50 mcg Oral ACB    metoprolol tartrate (LOPRESSOR) tablet 25 mg  25 mg Oral BID    vancomycin (VANCOCIN) 1,250 mg in 0.9% sodium chloride (MBP/ADV) 250 mL  1,250 mg IntraVENous Q24H    sodium chloride (NS) flush 5-40 mL  5-40 mL IntraVENous Q8H    sodium chloride (NS) flush 5-40 mL  5-40 mL IntraVENous PRN        Lab Data Reviewed: (see below)  Lab Review:     Recent Labs     07/18/20 0357 07/17/20 1756   WBC 5.6 8.4   HGB 10.1* 11.2*   HCT 31.2* 35.0    215     Recent Labs     07/18/20 0357 07/17/20  1756   * 132*   K 3.8 4.0    96*   CO2 27 29   GLU 88 95   BUN 18 24*   CREA 0.80 1.15*   CA 7.7* 8.5   ALB 2.5* 3.1*   TBILI 0.6 0.5   ALT 14 20     No results found for: GLUCPOC  No results for input(s): PH, PCO2, PO2, HCO3, FIO2 in the last 72 hours. No results for input(s): INR, INREXT in the last 72 hours. All Micro Results     None          I have reviewed notes of prior 24hr. Other pertinent lab:      Total time spent with patient: [address] discussed with: Patient, Nursing Staff and >50% of time spent in counseling and coordination of care    Discussed:  Care Plan    Prophylaxis:  Lovenox    Disposition:  SNF/LTC           ___________________________________________________    Attending Physician: Peyman Price MD

## 2020-07-18 NOTE — PROGRESS NOTES
7/18/2020  10:06 AM  CM completed assessment w/ pt's Dtr Griselda Mercedes (C) [phone] via phone. At baseline pt has dementia and resides at [facility] since 2/2020. Her Dtr states the adjustment has not been smooth since the pt also has anxiety and depression and family has not been able to visit since [facility]. At baseline pt is ambulatory and receives assistance w/ all ADLs at her [facility]. Family provides transport. Dtr Griselda Mercedes (C) [phone] is MPOA,  another Dtr Gordon Riojas (C) [phone] assists in car, pt has son Leatha Peacock lives in [place] (C)  [phone] he is POA  Reason for Admission:   Cellulitis  Pt is a 81 yo  female who presents to [facility] ED from her [facility] for welling of her Rt leg   PMHx: dementia, breast CA, hypothyroid, HTN                   RUR Score:     10%  Low Risk                Plan for utilizing home health:    Pt has had [facility] for PT/OT and SLP  Through [facility]  DME: None  Rx: filled through [facility]  PCP: First and Last name:  Dr Yessy Traore   Name of Practice: [facility]   Are you a current patient: Yes/No: Yes   Approximate date of last visit: 2 weeks   Can you participate in a virtual visit with your PCP: N/A                    Current Advanced Directive/Advance Care Plan:   Pt has POA son Leatha Peacock (C) [phone]          Dtr Griselda Mercedes (C) [phone] is MPOA   Pt has another Dtr Gordon Riojas (C) [phone]           Transition of Care Plan:                    [address] admission for medical management, pt on IV ABx  2. CM to follow through for treatment/response, discharge planning  3. D/C when stable to [facility] [phone] will need r/o COVID test to return  4. Outpatient f/u PCP  5.  Family will transport vs [facility] Management Interventions  PCP Verified by CM: Yes(pt sees PCP at [facility], last visit > 30 days)  Palliative Care Criteria Met (RRAT>21 & CHF Dx)?: No  Mode of Transport at Discharge: Self(Family)  Transition of Care Consult (CM Consult): Discharge Planning  Physical Therapy Consult: No  Occupational Therapy Consult: No  Speech Therapy Consult: No  Current Support Network: Family Lives Nearby, Assisted Living(pt resides at [facility] where she receives assistance w/ all ADLs, ambulatory relies on family for transport)  Confirm Follow Up Transport: Family  Discharge Location  Discharge Placement: Assisted Living([facility]

## 2020-07-18 NOTE — ED NOTES
Bedside shift change report given to YESSICA RN  (oncoming nurse) by BRICE RN  (offgoing nurse). Report included the following information SBAR.

## 2020-07-18 NOTE — ED NOTES
Patient scooted to the end of the stretcher and upon entering the room she said she needed more covers and that is what woke her up. One person assist to the bathroom and she sat and voided for a long time. Has yellow socks on now. Still c/o pain when ambulating in her right lower leg.

## 2020-07-18 NOTE — PROGRESS NOTES
Problem: Falls - Risk of  Goal: *Absence of Falls  Description: Document Shannon Maryncer Fall Risk and appropriate interventions in the flowsheet. Outcome: Progressing Towards Goal  Note: Fall Risk Interventions:  Mobility Interventions: Bed/chair exit alarm, Utilize walker, cane, or other assistive device    Mentation Interventions: Room close to nurse's station, Bed/chair exit alarm, Door open when patient unattended, More frequent rounding    Medication Interventions: Bed/chair exit alarm    Elimination Interventions: Bed/chair exit alarm, Call light in reach              Problem: Pressure Injury - Risk of  Goal: *Prevention of pressure injury  Description: Document Roger Scale and appropriate interventions in the flowsheet. Outcome: Progressing Towards Goal  Note: Pressure Injury Interventions:  Sensory Interventions: Keep linens dry and wrinkle-free, Minimize linen layers, Pad between skin to skin, Turn and reposition approx. every two hours (pillows and wedges if needed)    Moisture Interventions: Absorbent underpads, Apply protective barrier, creams and emollients, Check for incontinence Q2 hours and as needed, Moisture barrier    Activity Interventions: Increase time out of bed, PT/OT evaluation    Mobility Interventions: PT/OT evaluation, Pressure redistribution bed/mattress (bed type), Turn and reposition approx.  every two hours(pillow and wedges)    Nutrition Interventions: Offer support with meals,snacks and hydration

## 2020-07-18 NOTE — PROGRESS NOTES
Bedside and Verbal shift change report given to Savanna Hughes RN (oncoming nurse) by Ede Davison RN (offgoing nurse). Report included the following information SBAR and Kardex.

## 2020-07-19 LAB
ANION GAP SERPL CALC-SCNC: 6 MMOL/L (ref 5–15)
BASOPHILS # BLD: 0 K/UL (ref 0–0.1)
BASOPHILS NFR BLD: 1 % (ref 0–1)
BUN SERPL-MCNC: 15 MG/DL (ref 6–20)
BUN/CREAT SERPL: 16 (ref 12–20)
CALCIUM SERPL-MCNC: 7.9 MG/DL (ref 8.5–10.1)
CHLORIDE SERPL-SCNC: 105 MMOL/L (ref 97–108)
CO2 SERPL-SCNC: 26 MMOL/L (ref 21–32)
CREAT SERPL-MCNC: 0.93 MG/DL (ref 0.55–1.02)
DATE LAST DOSE: ABNORMAL
DIFFERENTIAL METHOD BLD: ABNORMAL
EOSINOPHIL # BLD: 0.5 K/UL (ref 0–0.4)
EOSINOPHIL NFR BLD: 11 % (ref 0–7)
ERYTHROCYTE [DISTWIDTH] IN BLOOD BY AUTOMATED COUNT: 12.5 % (ref 11.5–14.5)
GLUCOSE SERPL-MCNC: 83 MG/DL (ref 65–100)
HCT VFR BLD AUTO: 32.5 % (ref 35–47)
HGB BLD-MCNC: 10.3 G/DL (ref 11.5–16)
IMM GRANULOCYTES # BLD AUTO: 0 K/UL (ref 0–0.04)
IMM GRANULOCYTES NFR BLD AUTO: 0 % (ref 0–0.5)
LYMPHOCYTES # BLD: 1 K/UL (ref 0.8–3.5)
LYMPHOCYTES NFR BLD: 23 % (ref 12–49)
MCH RBC QN AUTO: 29.9 PG (ref 26–34)
MCHC RBC AUTO-ENTMCNC: 31.7 G/DL (ref 30–36.5)
MCV RBC AUTO: 94.5 FL (ref 80–99)
MONOCYTES # BLD: 0.6 K/UL (ref 0–1)
MONOCYTES NFR BLD: 13 % (ref 5–13)
NEUTS SEG # BLD: 2.3 K/UL (ref 1.8–8)
NEUTS SEG NFR BLD: 52 % (ref 32–75)
NRBC # BLD: 0 K/UL (ref 0–0.01)
NRBC BLD-RTO: 0 PER 100 WBC
PLATELET # BLD AUTO: 237 K/UL (ref 150–400)
PMV BLD AUTO: 9.6 FL (ref 8.9–12.9)
POTASSIUM SERPL-SCNC: 3.6 MMOL/L (ref 3.5–5.1)
RBC # BLD AUTO: 3.44 M/UL (ref 3.8–5.2)
REPORTED DOSE,DOSE: ABNORMAL UNITS
REPORTED DOSE/TIME,TMG: 1800
SODIUM SERPL-SCNC: 137 MMOL/L (ref 136–145)
VANCOMYCIN TROUGH SERPL-MCNC: 11.2 UG/ML (ref 5–10)
WBC # BLD AUTO: 4.5 K/UL (ref 3.6–11)

## 2020-07-19 PROCEDURE — 65270000029 HC RM PRIVATE

## 2020-07-19 PROCEDURE — 80048 BASIC METABOLIC PNL TOTAL CA: CPT

## 2020-07-19 PROCEDURE — 74011250636 HC RX REV CODE- 250/636: Performed by: INTERNAL MEDICINE

## 2020-07-19 PROCEDURE — 87186 SC STD MICRODIL/AGAR DIL: CPT

## 2020-07-19 PROCEDURE — 87077 CULTURE AEROBIC IDENTIFY: CPT

## 2020-07-19 PROCEDURE — 80202 ASSAY OF VANCOMYCIN: CPT

## 2020-07-19 PROCEDURE — 74011250637 HC RX REV CODE- 250/637: Performed by: FAMILY MEDICINE

## 2020-07-19 PROCEDURE — 74011000258 HC RX REV CODE- 258: Performed by: INTERNAL MEDICINE

## 2020-07-19 PROCEDURE — 85025 COMPLETE CBC W/AUTO DIFF WBC: CPT

## 2020-07-19 PROCEDURE — 74011250636 HC RX REV CODE- 250/636: Performed by: FAMILY MEDICINE

## 2020-07-19 PROCEDURE — 87205 SMEAR GRAM STAIN: CPT

## 2020-07-19 PROCEDURE — 36415 COLL VENOUS BLD VENIPUNCTURE: CPT

## 2020-07-19 RX ORDER — DIPHENHYDRAMINE HYDROCHLORIDE 50 MG/ML
12.5 INJECTION, SOLUTION INTRAMUSCULAR; INTRAVENOUS
Status: DISCONTINUED | OUTPATIENT
Start: 2020-07-19 | End: 2020-07-22 | Stop reason: HOSPADM

## 2020-07-19 RX ORDER — HALOPERIDOL 5 MG/ML
2 INJECTION INTRAMUSCULAR
Status: DISCONTINUED | OUTPATIENT
Start: 2020-07-19 | End: 2020-07-22 | Stop reason: HOSPADM

## 2020-07-19 RX ADMIN — Medication 10 ML: at 06:50

## 2020-07-19 RX ADMIN — DIPHENHYDRAMINE HYDROCHLORIDE 12.5 MG: 50 INJECTION, SOLUTION INTRAMUSCULAR; INTRAVENOUS at 15:06

## 2020-07-19 RX ADMIN — CEFEPIME HYDROCHLORIDE 2 G: 2 INJECTION, POWDER, FOR SOLUTION INTRAVENOUS at 14:13

## 2020-07-19 RX ADMIN — LEVOTHYROXINE SODIUM 50 MCG: 0.05 TABLET ORAL at 06:50

## 2020-07-19 RX ADMIN — Medication 10 ML: at 21:51

## 2020-07-19 RX ADMIN — VANCOMYCIN HYDROCHLORIDE 1250 MG: 1.25 INJECTION, POWDER, LYOPHILIZED, FOR SOLUTION INTRAVENOUS at 20:27

## 2020-07-19 RX ADMIN — SODIUM CHLORIDE 75 ML/HR: 900 INJECTION, SOLUTION INTRAVENOUS at 02:15

## 2020-07-19 RX ADMIN — DIPHENHYDRAMINE HYDROCHLORIDE 12.5 MG: 50 INJECTION, SOLUTION INTRAMUSCULAR; INTRAVENOUS at 09:34

## 2020-07-19 RX ADMIN — Medication 10 ML: at 21:50

## 2020-07-19 RX ADMIN — Medication 10 ML: at 14:23

## 2020-07-19 RX ADMIN — CEFEPIME HYDROCHLORIDE 2 G: 2 INJECTION, POWDER, FOR SOLUTION INTRAVENOUS at 22:44

## 2020-07-19 RX ADMIN — HALOPERIDOL LACTATE 2 MG: 5 INJECTION, SOLUTION INTRAMUSCULAR at 09:56

## 2020-07-19 RX ADMIN — HALOPERIDOL LACTATE 2 MG: 5 INJECTION, SOLUTION INTRAMUSCULAR at 16:08

## 2020-07-19 NOTE — PROGRESS NOTES
Bedside shift change report given to ANDRE Medrnao (oncoming nurse) by Geraldina Halsted, RN (offgoing nurse). Report included the following information SBAR, Kardex, Accordion, Recent Results and Med Rec Status.

## 2020-07-19 NOTE — PROGRESS NOTES
[facility] Vancomycin Pharmacy Consult 07/19/20  Vancomycin trough = 11.2 mcg/ml (drawn 23.7hrs post dose), extrapolates to a trough of 11.03 mcg/ml. Level is THERAPEUTIC   Goal Trough: 10-15 mcg/ml    Plan:   Will continue current regimen    Thank you  Fredrick Oliveira, PharmD  [phone]

## 2020-07-19 NOTE — PROGRESS NOTES
Patient continued to be agitated and stated she will not stay in room. Patient refused to take meds and is not allowing to be hooked up to IV. Patient keeps trying to tug at IV line.

## 2020-07-19 NOTE — PROGRESS NOTES
Bedside and Verbal shift change report given to Sunny Harley RN (oncoming nurse) by Lion Anders RN (offgoing nurse). Report included the following information SBAR, Kardex, MAR and Accordion.

## 2020-07-19 NOTE — PROGRESS NOTES
Spiritual Care Assessment/Progress Note  [address]      NAME: Manisha Aviles      MRN: [id number]  AGE: 80 y.o. SEX: female  Catholic Affiliation: Rastafari   Language: English     7/19/2020     Total Time (in minutes): 5     Spiritual Assessment begun in [facility]  MED SURG 2 through conversation with:         [x]Patient        [] Family    [] Friend(s)        Reason for Consult: Crisis     Spiritual beliefs: (Please include comment if needed)     [] Identifies with a jazlyn tradition:         [] Supported by a jazlyn community:            [] Claims no spiritual orientation:           [] Seeking spiritual identity:                [] Adheres to an individual form of spirituality:           [x] Not able to assess:                           Identified resources for coping:      [] Prayer                               [] Music                  [] Guided Imagery     [] Family/friends                 [] Pet visits     [] Devotional reading                         [x] Unknown     [] Other:                                             Interventions offered during this visit: (See comments for more details)                Plan of Care:     [] Support spiritual and/or cultural needs    [] Support AMD and/or advance care planning process      [] Support grieving process   [] Coordinate Rites and/or Rituals    [] Coordination with community clergy   [] No spiritual needs identified at this time   [] Detailed Plan of Care below (See Comments)  [] Make referral to Music Therapy  [] Make referral to Pet Therapy     [] Make referral to Addiction services  [] Make referral to [facility]  [] Make referral to Spiritual Care Partner  [] No future visits requested        [x] Follow up visits as needed     Comments:  responded to code Nicholson at 5 Med Surg unit. Staff were attending to pt. Spiritual care will follow up as needed or as able. Visited by: Rodolfo Samuel.   To eliu reinosoin: [phone])

## 2020-07-19 NOTE — PROGRESS NOTES
[facility]  [address] [facility], [address] [place] [address]  [phone]      Medical Progress Note      NAME: Cassie Rivera   :  1934  MRM:  [id number]    Date of service: 2020  10:08 AM       Assessment and Plan:   1. Cellulitis of right lower extremity. Failed outpatient ABx treatment. On vancomycin and cefepime. Duplex lower extremity ultrasound negative for DVT. Check MRSA nasal swab and wound culture     2. Hypertension. Continue amlodipine and metoprolol    3. Hypothyroidism. Continue levothyroxine     4. Dementia- supportive care. Patient resides at memory care unit in [facility]     5. Depression and anxiety-Continue with Lexapro     Attempted to reach family, but no answer. Will try again          Subjective:     Chief Complaint[de-identified] Patient was seen and examined as a follow up for cellulitis. Chart was reviewed. looks more confused     ROS:  (bold if positive, if negative)    Tolerating PT  Tolerating Diet        Objective:     Last 24hrs VS reviewed since prior progress note.  Most recent are:    Visit Vitals  /77 (BP 1 Location: Left arm, BP Patient Position: At rest)   Pulse 70   Temp 97.4 °F (36.3 °C)   Resp 18   Ht 5' 3\" (1.6 m)   Wt 72.6 kg (160 lb 0.9 oz)   SpO2 96%   BMI 28.35 kg/m²     SpO2 Readings from Last 6 Encounters:   20 96%   19 95%   13 97%            Intake/Output Summary (Last 24 hours) at 2020 0843  Last data filed at 2020 0617  Gross per 24 hour   Intake 925 ml   Output 450 ml   Net 475 ml        Physical Exam:    Gen:  Well-developed, well-nourished, in no acute distress  HEENT:  Pink conjunctivae, PERRL, hearing intact to voice, moist mucous membranes  Neck:  Supple, without masses, thyroid non-tender  Resp:  No accessory muscle use, clear breath sounds without wheezes rales or rhonchi  Card:  No murmurs, normal S1, S2 without thrills, bruits or peripheral edema  Abd:  Soft, non-tender, non-distended, normoactive bowel sounds are present, no palpable organomegaly and no detectable hernias  Lymph:  No cervical or inguinal adenopathy  Musc:  No cyanosis or clubbing  Skin:  RT leg oozing, hyperemic.    Neuro:  Cranial nerves are grossly intact, no focal motor weakness, follows commands appropriately  Psych:  poor insight,   __________________________________________________________________  Medications Reviewed: (see below)  Medications:     Current Facility-Administered Medications   Medication Dose Route Frequency    diphenhydrAMINE (BENADRYL) injection 12.5 mg  12.5 mg IntraVENous Q6H PRN    sodium chloride (NS) flush 5-40 mL  5-40 mL IntraVENous Q8H    sodium chloride (NS) flush 5-40 mL  5-40 mL IntraVENous PRN    acetaminophen (TYLENOL) tablet 650 mg  650 mg Oral Q6H PRN    Or    acetaminophen (TYLENOL) suppository 650 mg  650 mg Rectal Q6H PRN    polyethylene glycol (MIRALAX) packet 17 g  17 g Oral DAILY PRN    promethazine (PHENERGAN) tablet 12.5 mg  12.5 mg Oral Q6H PRN    Or    ondansetron (ZOFRAN) injection 4 mg  4 mg IntraVENous Q6H PRN    enoxaparin (LOVENOX) injection 40 mg  40 mg SubCUTAneous DAILY    0.9% sodium chloride infusion  75 mL/hr IntraVENous CONTINUOUS    amLODIPine (NORVASC) tablet 5 mg  5 mg Oral DAILY    escitalopram oxalate (LEXAPRO) tablet 20 mg  20 mg Oral DAILY    levothyroxine (SYNTHROID) tablet 50 mcg  50 mcg Oral ACB    metoprolol tartrate (LOPRESSOR) tablet 25 mg  25 mg Oral BID    vancomycin (VANCOCIN) 1,250 mg in 0.9% sodium chloride (MBP/ADV) 250 mL  1,250 mg IntraVENous Q24H    cefepime (MAXIPIME) 2 g in 0.9% sodium chloride (MBP/ADV) 100 mL  2 g IntraVENous Q12H    sodium chloride (NS) flush 5-40 mL  5-40 mL IntraVENous Q8H    sodium chloride (NS) flush 5-40 mL  5-40 mL IntraVENous PRN        Lab Data Reviewed: (see below)  Lab Review:     Recent Labs     07/19/20  0545 07/18/20  0357 07/17/20  1756   WBC 4.5 5.6 8.4   HGB 10.3* 10.1* 11.2*   HCT 32.5* 31.2* 35.0   PLT 237 182 215     Recent Labs     07/19/20  0545 07/18/20  0357 07/17/20  1756    134* 132*   K 3.6 3.8 4.0    102 96*   CO2 26 27 29   GLU 83 88 95   BUN 15 18 24*   CREA 0.93 0.80 1.15*   CA 7.9* 7.7* 8.5   ALB  --  2.5* 3.1*   TBILI  --  0.6 0.5   ALT  --  14 20     No results found for: GLUCPOC  No results for input(s): PH, PCO2, PO2, HCO3, FIO2 in the last 72 hours. No results for input(s): INR, INREXT, INREXT in the last 72 hours. All Micro Results     Procedure Component Value Units Date/Time    CULTURE, MRSA [[id number]] Collected:  07/18/20 1330    Order Status:  No result     CULTURE, MRSA [[id number]] Collected:  07/18/20 1231    Order Status:  Canceled Specimen:  Nasal     CULTURE, WOUND Ramirez Sun [[id number]]     Order Status:  Sent Specimen:  Wound Drainage           I have reviewed notes of prior 24hr. Other pertinent lab:      Total time spent with patient: [address] discussed with: Patient, Nursing Staff and >50% of time spent in counseling and coordination of care    Discussed:  Care Plan    Prophylaxis:  Lovenox    Disposition:  SNF/LTC           ___________________________________________________    Attending Physician: Neela Parker MD

## 2020-07-20 PROCEDURE — 74011000258 HC RX REV CODE- 258: Performed by: INTERNAL MEDICINE

## 2020-07-20 PROCEDURE — 74011250636 HC RX REV CODE- 250/636: Performed by: FAMILY MEDICINE

## 2020-07-20 PROCEDURE — 74011250636 HC RX REV CODE- 250/636: Performed by: INTERNAL MEDICINE

## 2020-07-20 PROCEDURE — 65270000029 HC RM PRIVATE

## 2020-07-20 PROCEDURE — 74011250637 HC RX REV CODE- 250/637: Performed by: FAMILY MEDICINE

## 2020-07-20 RX ORDER — LORAZEPAM 2 MG/ML
0.5 INJECTION INTRAMUSCULAR ONCE
Status: COMPLETED | OUTPATIENT
Start: 2020-07-20 | End: 2020-07-20

## 2020-07-20 RX ADMIN — METOPROLOL TARTRATE 25 MG: 25 TABLET, FILM COATED ORAL at 12:49

## 2020-07-20 RX ADMIN — CEFEPIME HYDROCHLORIDE 2 G: 2 INJECTION, POWDER, FOR SOLUTION INTRAVENOUS at 22:35

## 2020-07-20 RX ADMIN — HALOPERIDOL LACTATE 2 MG: 5 INJECTION, SOLUTION INTRAMUSCULAR at 17:08

## 2020-07-20 RX ADMIN — Medication 10 ML: at 06:38

## 2020-07-20 RX ADMIN — LEVOTHYROXINE SODIUM 50 MCG: 0.05 TABLET ORAL at 06:37

## 2020-07-20 RX ADMIN — Medication 10 ML: at 13:44

## 2020-07-20 RX ADMIN — Medication 10 ML: at 22:36

## 2020-07-20 RX ADMIN — METOPROLOL TARTRATE 25 MG: 25 TABLET, FILM COATED ORAL at 17:49

## 2020-07-20 RX ADMIN — LORAZEPAM 0.5 MG: 2 INJECTION INTRAMUSCULAR; INTRAVENOUS at 18:50

## 2020-07-20 RX ADMIN — AMLODIPINE BESYLATE 5 MG: 5 TABLET ORAL at 12:48

## 2020-07-20 RX ADMIN — VANCOMYCIN HYDROCHLORIDE 1250 MG: 1.25 INJECTION, POWDER, LYOPHILIZED, FOR SOLUTION INTRAVENOUS at 17:49

## 2020-07-20 RX ADMIN — Medication 10 ML: at 13:45

## 2020-07-20 RX ADMIN — CEFEPIME HYDROCHLORIDE 2 G: 2 INJECTION, POWDER, FOR SOLUTION INTRAVENOUS at 11:22

## 2020-07-20 NOTE — PROGRESS NOTES
Bedside and Verbal shift change report given to Zurdo Russ RN (oncoming nurse) by Car Casey RN (offgoing nurse). Report included the following information SBAR, Kardex, MAR and Accordion.

## 2020-07-20 NOTE — PROGRESS NOTES
7/20/2020   CARE MANAGEMENT NOTE:  CM reviewed EMR and handoff received from weekend . Pt was admitted with cellulitis of right LE and failed outpt treatment. She is not on IV ABX (Vanc and Cefepime). Also with dementia. MPOA Reford Kimberli ([phone]). Dtr Claudia Soto  (c [phone])     SARAH SonRichi lives in [place]  (D [phone])    RUR 11%    Transition Plan of Care:    1. Pt will [facility] to [facility] in [address] [place] [address] ([phone]). 2.  COVID 19 test for return to [facility] was ordered on 7/20. 3.  Pt had [facility] services in the past.  4.  CM will discuss mode of transportation for discharge (family vs w/c Elta Mercury). CM will continue to follow pt until discharged.   Owen

## 2020-07-20 NOTE — PROGRESS NOTES
0900 Patient upset this morning. Refusing all meds. Had a BM and voided on bedside commode. 36 Spoke with Patient son. Daughter will be visiting if RN thinks it will help. Patient wants her here. 1200 Patient /84. RN reassessed patient, much more pleasant and compliant. 1230 Patient willing to take metoprolol and amlodipine. RN will reassess vitals shortly. 1545 Patient BP now 135/66    1615 Patient becoming agitated. Able to be encouraged back to chair or bed. 1700 Patient aggressive and walking around in room yelling. Haldol given. 1845 Haldol did not effect patient. Ativan one time dose ordered by MD and given.

## 2020-07-20 NOTE — PROGRESS NOTES
Spiritual Care Assessment/Progress Note  [facility]      NAME: Asher Singh      MRN: [id number]  AGE: 80 y.o. SEX: female  Gnosticist Affiliation: Anabaptist   Language: English     7/20/2020     Total Time (in minutes): 20     Spiritual Assessment begun in [facility]  MED SURG 2 through conversation with:         [x]Patient        [x] Family    [] Friend(s)        Reason for Consult: Initial/Spiritual assessment, patient floor     Spiritual beliefs: (Please include comment if needed)     [x] Identifies with a jazlyn tradition: Sikhism        [] Supported by a jazlyn community:            [] Claims no spiritual orientation:           [] Seeking spiritual identity:                [] Adheres to an individual form of spirituality:           [] Not able to assess:                           Identified resources for coping:      [] Prayer                               [] Music                  [] Guided Imagery     [x] Family/friends                 [] Pet visits     [] Devotional reading                         [] Unknown     [] Other:                                              Interventions offered during this visit: (See comments for more details)    Patient Interventions: Other (comment)(Unable to assess)           Plan of Care:     [] Support spiritual and/or cultural needs    [] Support AMD and/or advance care planning process      [] Support grieving process   [] Coordinate Rites and/or Rituals    [] Coordination with community clergy   [] No spiritual needs identified at this time   [] Detailed Plan of Care below (See Comments)  [] Make referral to Music Therapy  [] Make referral to Pet Therapy     [] Make referral to Addiction services  [] Make referral to [facility]  [] Make referral to Spiritual Care Partner  [] No future visits requested        [x] Follow up visits as needed     Comments:  responded to staff request to visit with Mrs. Grove on the Med. Surgical unit. Mrs. Maribell Jones was lying in bed and appeared to be resting comfortably. Her daughter, Ny Lewis, was standing near the bedside.  introduced herself and extended support. Ny Lewis greeted the  warmly and made good eye contact. She shared that she was holding up alright, considering everything that has been going on.  continued to be a supportive presence as Ny Lewis shared that their family just lost their father, June 27 of this year, and having their mom in the hospital has been very difficult.  remained present as Ny Lewis shared other feelings and concerns, and shared about additional life stressors that their family has been dealing with.  inquired how she could support Mrs. Grove at this time and Ny Lewis shared that she had no additional needs. She did note that Mrs. Colette Vásquez is Spiritism and had been attending the services offered at her nursing facility. Advised of 's availability.  will follow up as able and/or needed  VeryLastRoom. Ozzie Nino.      Paging Service: [phone])

## 2020-07-20 NOTE — PROGRESS NOTES
Patient's daughter, Crystal Gaitan, called and admission paperwork done with Ms. Eleazar Mustafa over the phone. Ms. Eleazar Mustafa provided patient's POA information. [place] of [place] is patient's POA. Mr. Bolivar Barriga number placed in patient's demographics. Ms. Eleazar Mustafa is local, as is her sister, Meghana Menon.

## 2020-07-20 NOTE — WOUND CARE
80 y.o. female with PMH including dementia, Breast Ca (right mastectomy), Hypothyroid, and HTN. Admitted for RLE swelling and cellulitis. Wound consult to evaluate skin injury to the RLE present on admission. Assessment:  Patient is sitting up in bed without complaint. On assessment the RLE has a dry woody appearance with erythema marked at the upper leg. There are dry Flaking areas of yellow eschar (appear to be resolving wounds) to the anterior/lateral leg and to the medial/anterior/lateral ankle and dorsal foot. Ke-wound skin has scattered excoriations (patient states she scratches because of the itching). The Left lower leg has woody dry skin. Heels are Red/Blanching bilaterally. DP Pulses palpable bilaterally. Right leg is warm to palp. Patient denies pain. Per bedside RN Patient is ambulatory to the bedside commode with assist. She can be confused and easily agitated. She was pleasant for LE assessment however became a little restless after applying a xeroform dressing to the RLE. Allowed patient some time to rest and returned later to evaluate the sacral/gluteal areas. On Follow up assessment patient was able to turn and reposition with minimal assist, though she needs encouragement to turn/reposition in bed. Bedside RN in room to assist. Bilateral inner glutes noted to have mild moisture related blanching erythema. Applied ZInc barrier and Assisted patient to position on the left side in bed. Recommendation:  Daily with skin care apply BLUE TOP lotion to extremities and bony prominences for protection from friction/shear. Apply ORANGE TOP zinc barrier to the gluteal/ke-areas daily and as needed with moisture. Float heels off mattress. Have patient turn Q2h while in bed. Protect from lines and devices. When up in chair use a waffle cushion and reposition every 20 minutes. To the RLE - Cleanse with wound cleanser and apply BLUE TOP lotion to all skin.  Cover the areas of dry eschar with xeroform and apply gauze to protect. Secure with 4 inch Kerlex and Tape. Change Every other Day.      Will Follow, Consult as needed,   DENISE Cortes RN [facility], [facility].

## 2020-07-20 NOTE — PROGRESS NOTES
Freddie Guerrier deshaun [address]  [address] [facility], [place], [address] [place] [address]  [phone]      Medical Progress Note      NAME: Jose Pedroza   :  1934  MRM:  [id number]    Date of service: 2020  10:08 AM       Assessment and Plan:   1. Cellulitis of right lower extremity. Failed outpatient ABx treatment. On vancomycin and cefepime. Duplex lower extremity ultrasound negative for DVT. MRSA nasal swab is negative. wound culture is no growth so far. Wound care evaluation     2. Hypertension. Continue amlodipine and metoprolol    3. Hypothyroidism. Continue levothyroxine     4. Dementia/ delirium- supportive care. Patient resides at memory care unit in [facility]. Haldol PRN     5.  Depression and anxiety-Continue with Lexapro     Attempted to reach family, but no answer. Will try again          Subjective:     Chief Complaint[de-identified] Patient was seen and examined as a follow up for cellulitis. Chart was reviewed. No events overnight     ROS:  (bold if positive, if negative)    Tolerating PT  Tolerating Diet        Objective:     Last 24hrs VS reviewed since prior progress note. Most recent are:    Visit Vitals  /75 (BP 1 Location: Right arm, BP Patient Position: Sitting; At rest;Other (comment))   Pulse 68   Temp 97.5 °F (36.4 °C)   Resp 18   Ht 5' 3\" (1.6 m)   Wt 72.6 kg (160 lb 0.9 oz)   SpO2 92%   BMI 28.35 kg/m²     SpO2 Readings from Last 6 Encounters:   20 92%   19 95%   13 97%            Intake/Output Summary (Last 24 hours) at 2020 0806  Last data filed at 2020 1533  Gross per 24 hour   Intake 820 ml   Output    Net 820 ml        Physical Exam:    Gen:  Well-developed, well-nourished, in no acute distress  HEENT:  Pink conjunctivae, PERRL, hearing intact to voice, moist mucous membranes  Neck:  Supple, without masses, thyroid non-tender  Resp:  No accessory muscle use, clear breath sounds without wheezes rales or rhonchi  Card:  No murmurs, normal S1, S2 without thrills, bruits or peripheral edema  Abd:  Soft, non-tender, non-distended, normoactive bowel sounds are present, no palpable organomegaly and no detectable hernias  Lymph:  No cervical or inguinal adenopathy  Musc:  No cyanosis or clubbing  Skin:  RT leg oozing, hyperemic.    Neuro:  Cranial nerves are grossly intact, no focal motor weakness, follows commands appropriately  Psych:  poor insight,   __________________________________________________________________  Medications Reviewed: (see below)  Medications:     Current Facility-Administered Medications   Medication Dose Route Frequency    diphenhydrAMINE (BENADRYL) injection 12.5 mg  12.5 mg IntraVENous Q6H PRN    haloperidol lactate (HALDOL) injection 2 mg  2 mg IntraVENous Q6H PRN    sodium chloride (NS) flush 5-40 mL  5-40 mL IntraVENous Q8H    sodium chloride (NS) flush 5-40 mL  5-40 mL IntraVENous PRN    acetaminophen (TYLENOL) tablet 650 mg  650 mg Oral Q6H PRN    Or    acetaminophen (TYLENOL) suppository 650 mg  650 mg Rectal Q6H PRN    polyethylene glycol (MIRALAX) packet 17 g  17 g Oral DAILY PRN    promethazine (PHENERGAN) tablet 12.5 mg  12.5 mg Oral Q6H PRN    Or    ondansetron (ZOFRAN) injection 4 mg  4 mg IntraVENous Q6H PRN    enoxaparin (LOVENOX) injection 40 mg  40 mg SubCUTAneous DAILY    amLODIPine (NORVASC) tablet 5 mg  5 mg Oral DAILY    escitalopram oxalate (LEXAPRO) tablet 20 mg  20 mg Oral DAILY    levothyroxine (SYNTHROID) tablet 50 mcg  50 mcg Oral ACB    metoprolol tartrate (LOPRESSOR) tablet 25 mg  25 mg Oral BID    vancomycin (VANCOCIN) 1,250 mg in 0.9% sodium chloride (MBP/ADV) 250 mL  1,250 mg IntraVENous Q24H    cefepime (MAXIPIME) 2 g in 0.9% sodium chloride (MBP/ADV) 100 mL  2 g IntraVENous Q12H    sodium chloride (NS) flush 5-40 mL  5-40 mL IntraVENous Q8H    sodium chloride (NS) flush 5-40 mL  5-40 mL IntraVENous PRN        Lab Data Reviewed: (see below)  Lab Review:     Recent Labs     07/19/20  8920 07/18/20  0357 07/17/20  1756   WBC 4.5 5.6 8.4   HGB 10.3* 10.1* 11.2*   HCT 32.5* 31.2* 35.0    182 215     Recent Labs     07/19/20  0545 07/18/20  0357 07/17/20  1756    134* 132*   K 3.6 3.8 4.0    102 96*   CO2 26 27 29   GLU 83 88 95   BUN 15 18 24*   CREA 0.93 0.80 1.15*   CA 7.9* 7.7* 8.5   ALB  --  2.5* 3.1*   TBILI  --  0.6 0.5   ALT  --  14 20     No results found for: GLUCPOC  No results for input(s): PH, PCO2, PO2, HCO3, FIO2 in the last 72 hours. No results for input(s): INR, INREXT, INREXT in the last 72 hours. All Micro Results     Procedure Component Value Units Date/Time    CULTURE, MRSA [[id number]] Collected:  07/19/20 1428    Order Status:  Completed Specimen:  Nasal from Nares Updated:  07/20/20 0753     Special Requests: NO SPECIAL REQUESTS        Culture result:       MRSA NOT PRESENT AT 15 HOURS          CULTURE, Mensah Flurry STAIN [[id number]] Collected:  07/19/20 1428    Order Status:  Completed Specimen:  Wound from Leg Updated:  07/19/20 1844     Special Requests: NO SPECIAL REQUESTS        GRAM STAIN OCCASIONAL WBCS SEEN         NO ORGANISMS SEEN        Culture result: PENDING    CULTURE, MRSA [[id number]]     Order Status:  Sent Specimen:  Nares     CULTURE, Mensah Flurry STAIN [[id number]]     Order Status:  Sent Specimen:  Wound Drainage     CULTURE, MRSA [[id number]] Collected:  07/18/20 1330    Order Status:  Canceled     CULTURE, MRSA [[id number]] Collected:  07/18/20 1231    Order Status:  Canceled Specimen:  Nasal     CULTURE, WOUND Vita Schilder STAIN [[id number]]     Order Status:  Canceled Specimen:  Wound Drainage           I have reviewed notes of prior 24hr. Other pertinent lab:      Total time spent with patient: [address] discussed with: Patient, Nursing Staff and >50% of time spent in counseling and coordination of care    Discussed:  Care Plan    Prophylaxis:  Lovenox    Disposition: SNF/LTC           ___________________________________________________    Attending Physician: Lory Mckinney MD

## 2020-07-21 LAB
BACTERIA SPEC CULT: ABNORMAL
DATE LAST DOSE: ABNORMAL
GRAM STN SPEC: ABNORMAL
GRAM STN SPEC: ABNORMAL
REPORTED DOSE,DOSE: ABNORMAL UNITS
REPORTED DOSE/TIME,TMG: ABNORMAL
SARS-COV-2, COV2: NOT DETECTED
SERVICE CMNT-IMP: ABNORMAL
SERVICE CMNT-IMP: ABNORMAL
SOURCE, COVRS: NORMAL
SPECIMEN SOURCE, FCOV2M: NORMAL
VANCOMYCIN TROUGH SERPL-MCNC: 15 UG/ML (ref 5–10)

## 2020-07-21 PROCEDURE — 87635 SARS-COV-2 COVID-19 AMP PRB: CPT

## 2020-07-21 PROCEDURE — 74011000258 HC RX REV CODE- 258: Performed by: INTERNAL MEDICINE

## 2020-07-21 PROCEDURE — 74011250637 HC RX REV CODE- 250/637: Performed by: FAMILY MEDICINE

## 2020-07-21 PROCEDURE — 74011250636 HC RX REV CODE- 250/636: Performed by: INTERNAL MEDICINE

## 2020-07-21 PROCEDURE — 80202 ASSAY OF VANCOMYCIN: CPT

## 2020-07-21 PROCEDURE — 74011250636 HC RX REV CODE- 250/636: Performed by: FAMILY MEDICINE

## 2020-07-21 PROCEDURE — 65270000029 HC RM PRIVATE

## 2020-07-21 PROCEDURE — 36415 COLL VENOUS BLD VENIPUNCTURE: CPT

## 2020-07-21 RX ADMIN — METOPROLOL TARTRATE 25 MG: 25 TABLET, FILM COATED ORAL at 18:56

## 2020-07-21 RX ADMIN — Medication 10 ML: at 22:15

## 2020-07-21 RX ADMIN — Medication 10 ML: at 18:57

## 2020-07-21 RX ADMIN — CEFEPIME HYDROCHLORIDE 2 G: 2 INJECTION, POWDER, FOR SOLUTION INTRAVENOUS at 12:17

## 2020-07-21 RX ADMIN — Medication 10 ML: at 06:44

## 2020-07-21 RX ADMIN — ENOXAPARIN SODIUM 40 MG: 40 INJECTION SUBCUTANEOUS at 08:41

## 2020-07-21 RX ADMIN — Medication 10 ML: at 22:16

## 2020-07-21 RX ADMIN — METOPROLOL TARTRATE 25 MG: 25 TABLET, FILM COATED ORAL at 08:41

## 2020-07-21 RX ADMIN — LEVOTHYROXINE SODIUM 50 MCG: 0.05 TABLET ORAL at 06:43

## 2020-07-21 RX ADMIN — VANCOMYCIN HYDROCHLORIDE 1250 MG: 1.25 INJECTION, POWDER, LYOPHILIZED, FOR SOLUTION INTRAVENOUS at 18:56

## 2020-07-21 RX ADMIN — HALOPERIDOL LACTATE 2 MG: 5 INJECTION, SOLUTION INTRAMUSCULAR at 19:23

## 2020-07-21 RX ADMIN — CEFEPIME HYDROCHLORIDE 2 G: 2 INJECTION, POWDER, FOR SOLUTION INTRAVENOUS at 22:15

## 2020-07-21 RX ADMIN — AMLODIPINE BESYLATE 5 MG: 5 TABLET ORAL at 08:41

## 2020-07-21 RX ADMIN — ESCITALOPRAM OXALATE 20 MG: 10 TABLET ORAL at 08:41

## 2020-07-21 RX ADMIN — DIPHENHYDRAMINE HYDROCHLORIDE 12.5 MG: 50 INJECTION, SOLUTION INTRAMUSCULAR; INTRAVENOUS at 20:44

## 2020-07-21 NOTE — PROGRESS NOTES
Pt refusing COVID testing swab. Offered to have pt help with insertion of swab. Pt put the swab at the tip of her nose then refused to do any further. Pt adamant she will not let me try again. [id number]    Bedside and Verbal shift change report given to [id number] Javier Garcia (oncoming nurse) by Mary Ann Luna (offgoing nurse). Report included the following information SBAR, Kardex and MAR.

## 2020-07-21 NOTE — PROGRESS NOTES
Freddie Guerrier [facility] [address]  [address] [facility], [place], [address] [place] [address]  [phone]      Medical Progress Note      NAME: Asher Singh   :  1934  MRM:  [id number]    Date of service: 2020  10:08 AM       Assessment and Plan:   1. Cellulitis of right lower extremity. Failed outpatient ABx treatment. On vancomycin and cefepime. Duplex lower extremity ultrasound negative for DVT. MRSA nasal swab is positive. wound culture shows staph aureus. Wound care evaluation appreciated    2. Hypertension. Continue amlodipine and metoprolol    3. Hypothyroidism. Continue levothyroxine     4. Dementia/ delirium- supportive care. Patient resides at memory care unit in [facility]. Haldol PRN     5.  Depression and anxiety-Continue with Lexapro               Subjective:     Chief Complaint[de-identified] Patient was seen and examined as a follow up for cellulitis. Chart was reviewed. No events overnight, but pt is crying in her room     ROS:  (bold if positive, if negative)    Tolerating PT  Tolerating Diet        Objective:     Last 24hrs VS reviewed since prior progress note.  Most recent are:    Visit Vitals  /76 (BP 1 Location: Left leg)   Pulse 66   Temp 97.9 °F (36.6 °C)   Resp 16   Ht 5' 3\" (1.6 m)   Wt 72.6 kg (160 lb 0.9 oz)   SpO2 93%   BMI 28.35 kg/m²     SpO2 Readings from Last 6 Encounters:   20 93%   19 95%   13 97%          No intake or output data in the 24 hours ending 20 0926     Physical Exam:    Gen:  Well-developed, well-nourished, in no acute distress  HEENT:  Pink conjunctivae, PERRL, hearing intact to voice, moist mucous membranes  Neck:  Supple, without masses, thyroid non-tender  Resp:  No accessory muscle use, clear breath sounds without wheezes rales or rhonchi  Card:  No murmurs, normal S1, S2 without thrills, bruits or peripheral edema  Abd:  Soft, non-tender, non-distended, normoactive bowel sounds are present, no palpable organomegaly and no detectable hernias  Lymph:  No cervical or inguinal adenopathy  Musc:  No cyanosis or clubbing  Skin:  RT leg oozing, hyperemic.    Neuro:  Cranial nerves are grossly intact, no focal motor weakness, follows commands appropriately  Psych:  poor insight,   __________________________________________________________________  Medications Reviewed: (see below)  Medications:     Current Facility-Administered Medications   Medication Dose Route Frequency    diphenhydrAMINE (BENADRYL) injection 12.5 mg  12.5 mg IntraVENous Q6H PRN    haloperidol lactate (HALDOL) injection 2 mg  2 mg IntraVENous Q6H PRN    sodium chloride (NS) flush 5-40 mL  5-40 mL IntraVENous Q8H    sodium chloride (NS) flush 5-40 mL  5-40 mL IntraVENous PRN    acetaminophen (TYLENOL) tablet 650 mg  650 mg Oral Q6H PRN    Or    acetaminophen (TYLENOL) suppository 650 mg  650 mg Rectal Q6H PRN    polyethylene glycol (MIRALAX) packet 17 g  17 g Oral DAILY PRN    promethazine (PHENERGAN) tablet 12.5 mg  12.5 mg Oral Q6H PRN    Or    ondansetron (ZOFRAN) injection 4 mg  4 mg IntraVENous Q6H PRN    enoxaparin (LOVENOX) injection 40 mg  40 mg SubCUTAneous DAILY    amLODIPine (NORVASC) tablet 5 mg  5 mg Oral DAILY    escitalopram oxalate (LEXAPRO) tablet 20 mg  20 mg Oral DAILY    levothyroxine (SYNTHROID) tablet 50 mcg  50 mcg Oral ACB    metoprolol tartrate (LOPRESSOR) tablet 25 mg  25 mg Oral BID    vancomycin (VANCOCIN) 1,250 mg in 0.9% sodium chloride (MBP/ADV) 250 mL  1,250 mg IntraVENous Q24H    cefepime (MAXIPIME) 2 g in 0.9% sodium chloride (MBP/ADV) 100 mL  2 g IntraVENous Q12H    sodium chloride (NS) flush 5-40 mL  5-40 mL IntraVENous Q8H    sodium chloride (NS) flush 5-40 mL  5-40 mL IntraVENous PRN        Lab Data Reviewed: (see below)  Lab Review:     Recent Labs     07/19/20  0545   WBC 4.5   HGB 10.3*   HCT 32.5*        Recent Labs     07/19/20  0545      K 3.6      CO2 26   GLU 83   BUN 15   CREA 0.93   CA 7.9*     No results found for: GLUCPOC  No results for input(s): PH, PCO2, PO2, HCO3, FIO2 in the last 72 hours. No results for input(s): INR, INREXT, INREXT in the last 72 hours. All Micro Results     Procedure Component Value Units Date/Time    CULTURE, MRSA [[id number]]  (Abnormal) Collected:  07/19/20 1428    Order Status:  Completed Specimen:  Nasal from Nares Updated:  07/21/20 0804     Special Requests: NO SPECIAL REQUESTS        Culture result: MRSA PRESENT                   Screening of patient nares for MRSA is for surveillance purposes and, if positive, to facilitate isolation considerations in high risk settings. It is not intended for automatic decolonization interventions per se as regimens are not sufficiently effective to warrant routine use. CULTURE, Huel Puller STAIN [[id number]]  (Abnormal) Collected:  07/19/20 1428    Order Status:  Completed Specimen:  Wound from Leg Updated:  07/20/20 1554     Special Requests: NO SPECIAL REQUESTS        GRAM STAIN OCCASIONAL WBCS SEEN         NO ORGANISMS SEEN        Culture result:       HEAVY STAPHYLOCOCCUS AUREUS SENSITIVITY TO FOLLOW                  MODERATE MIXED SKIN AURE ISOLATED          CULTURE, MRSA [[id number]]     Order Status:  Sent Specimen:  Nares     CULTURE, Huel Puller STAIN [[id number]]     Order Status:  Sent Specimen:  Wound Drainage     CULTURE, MRSA [[id number]] Collected:  07/18/20 1330    Order Status:  Canceled     CULTURE, MRSA [[id number]] Collected:  07/18/20 1231    Order Status:  Canceled Specimen:  Nasal     CULTURE, WOUND Haze Hoang STAIN [[id number]]     Order Status:  Canceled Specimen:  Wound Drainage           I have reviewed notes of prior 24hr. Other pertinent lab:      Total time spent with patient: [address] 59 discussed with: Patient, Nursing Staff and >50% of time spent in counseling and coordination of care    Discussed:  Care Plan    Prophylaxis:  Lovenox    Disposition: SNF/LTC           ___________________________________________________    Attending Physician: Jeimy Jefferson MD

## 2020-07-21 NOTE — PROGRESS NOTES
[facility] Vancomycin Pharmacy Consult 07/21/20  Vancomycin trough = 15 mcg/ml (drawn 25hrs post dose), extrapolates to a trough of 15.66 mcg/ml. Level is THERAPEUTIC      Plan:   Will continue current regimen    Thank you  Sary Mariee, PharmD  [phone]

## 2020-07-21 NOTE — PROGRESS NOTES
7/21/2020   CARE MANAGEMENT NOTE:  CM reviewed EMR for clinical updates. Pt was admitted with cellulitis of right LE and failed outpt treatment. She is now on IV ABX (Vanc and Cefepime). Also with dementia. MPOA Author  ([phone]). Dtr Silvia Arce  (c [phone])     POMALIA Son, Lilli Escobar lives in [place]  (J [phone])     RUR 11%     Transition Plan of Care:    1. Pt will return to [facility] in [address] [place] [address] ([phone] 2.  COVID 19 test for return to care home was re-ordered 7/21. 3.  Pt had [facility] services in the past. Wound care orders received; CM sent a referral to aforementioned agency. 4  CM will discuss mode of transportation for discharge (family [facility]     CM will continue to follow pt until discharged.   Owen

## 2020-07-22 VITALS
HEIGHT: 63 IN | DIASTOLIC BLOOD PRESSURE: 71 MMHG | BODY MASS INDEX: 28.36 KG/M2 | TEMPERATURE: 98.2 F | OXYGEN SATURATION: 98 % | SYSTOLIC BLOOD PRESSURE: 118 MMHG | RESPIRATION RATE: 14 BRPM | HEART RATE: 55 BPM | WEIGHT: 160.05 LBS

## 2020-07-22 PROBLEM — Z22.322 MRSA (METHICILLIN RESISTANT STAPH AUREUS) CULTURE POSITIVE: Status: ACTIVE | Noted: 2020-07-22

## 2020-07-22 LAB — CREAT SERPL-MCNC: 0.94 MG/DL (ref 0.55–1.02)

## 2020-07-22 PROCEDURE — 74011250636 HC RX REV CODE- 250/636: Performed by: INTERNAL MEDICINE

## 2020-07-22 PROCEDURE — 74011250636 HC RX REV CODE- 250/636: Performed by: FAMILY MEDICINE

## 2020-07-22 PROCEDURE — 82565 ASSAY OF CREATININE: CPT

## 2020-07-22 PROCEDURE — 36415 COLL VENOUS BLD VENIPUNCTURE: CPT

## 2020-07-22 PROCEDURE — 74011250637 HC RX REV CODE- 250/637: Performed by: FAMILY MEDICINE

## 2020-07-22 RX ORDER — DOXYCYCLINE 100 MG/1
100 TABLET ORAL 2 TIMES DAILY
Qty: 14 TAB | Refills: 0 | Status: SHIPPED | OUTPATIENT
Start: 2020-07-22 | End: 2021-03-15

## 2020-07-22 RX ADMIN — Medication 10 ML: at 13:21

## 2020-07-22 RX ADMIN — LEVOTHYROXINE SODIUM 50 MCG: 0.05 TABLET ORAL at 06:24

## 2020-07-22 RX ADMIN — DIPHENHYDRAMINE HYDROCHLORIDE 12.5 MG: 50 INJECTION, SOLUTION INTRAMUSCULAR; INTRAVENOUS at 02:57

## 2020-07-22 RX ADMIN — METOPROLOL TARTRATE 25 MG: 25 TABLET, FILM COATED ORAL at 09:37

## 2020-07-22 RX ADMIN — Medication 10 ML: at 05:12

## 2020-07-22 RX ADMIN — ESCITALOPRAM OXALATE 20 MG: 10 TABLET ORAL at 09:37

## 2020-07-22 RX ADMIN — ENOXAPARIN SODIUM 40 MG: 40 INJECTION SUBCUTANEOUS at 09:37

## 2020-07-22 RX ADMIN — AMLODIPINE BESYLATE 5 MG: 5 TABLET ORAL at 09:37

## 2020-07-22 NOTE — PROGRESS NOTES
Freddie Guerrier deshaun [address]  [address] [facility], [address] [place] [address]  [phone]      Medical Progress Note      NAME: Asher Singh   :  1934  MRM:  [id number]    Date of service: 2020  10:08 AM       Assessment and Plan:   1. Cellulitis of right lower extremity. Failed outpatient ABx treatment. On vancomycin. Will change ABx to doxy. Duplex lower extremity ultrasound negative for DVT. MRSA nasal swab is positive. wound culture shows MRSA. Wound care evaluation appreciated    2. Hypertension. Continue amlodipine and metoprolol    3. Hypothyroidism. Continue levothyroxine     4. Dementia/ delirium- supportive care. Patient resides at memory care unit in [facility]. Haldol PRN     5.  Depression and anxiety-Continue with Lexapro               Subjective:     Chief Complaint[de-identified] Patient was seen and examined as a follow up for cellulitis. Chart was reviewed. More calm this morning     ROS:  (bold if positive, if negative)    Tolerating PT  Tolerating Diet        Objective:     Last 24hrs VS reviewed since prior progress note.  Most recent are:    Visit Vitals  /89 (BP 1 Location: Right arm, BP Patient Position: At rest;Supine)   Pulse 61   Temp 97.4 °F (36.3 °C)   Resp 16   Ht 5' 3\" (1.6 m)   Wt 72.6 kg (160 lb 0.9 oz)   SpO2 91%   BMI 28.35 kg/m²     SpO2 Readings from Last 6 Encounters:   20 91%   19 95%   13 97%          No intake or output data in the 24 hours ending 20 0917     Physical Exam:    Gen:  Well-developed, well-nourished, in no acute distress  HEENT:  Pink conjunctivae, PERRL, hearing intact to voice, moist mucous membranes  Neck:  Supple, without masses, thyroid non-tender  Resp:  No accessory muscle use, clear breath sounds without wheezes rales or rhonchi  Card:  No murmurs, normal S1, S2 without thrills, bruits or peripheral edema  Abd:  Soft, non-tender, non-distended, normoactive bowel sounds are present, no palpable organomegaly and no detectable hernias  Lymph:  No cervical or inguinal adenopathy  Musc:  No cyanosis or clubbing  Skin:  RT leg oozing, hyperemic. Neuro:  Cranial nerves are grossly intact, no focal motor weakness, follows commands appropriately  Psych:  poor insight,   __________________________________________________________________  Medications Reviewed: (see below)  Medications:     Current Facility-Administered Medications   Medication Dose Route Frequency    diphenhydrAMINE (BENADRYL) injection 12.5 mg  12.5 mg IntraVENous Q6H PRN    haloperidol lactate (HALDOL) injection 2 mg  2 mg IntraVENous Q6H PRN    sodium chloride (NS) flush 5-40 mL  5-40 mL IntraVENous Q8H    sodium chloride (NS) flush 5-40 mL  5-40 mL IntraVENous PRN    acetaminophen (TYLENOL) tablet 650 mg  650 mg Oral Q6H PRN    Or    acetaminophen (TYLENOL) suppository 650 mg  650 mg Rectal Q6H PRN    polyethylene glycol (MIRALAX) packet 17 g  17 g Oral DAILY PRN    promethazine (PHENERGAN) tablet 12.5 mg  12.5 mg Oral Q6H PRN    Or    ondansetron (ZOFRAN) injection 4 mg  4 mg IntraVENous Q6H PRN    enoxaparin (LOVENOX) injection 40 mg  40 mg SubCUTAneous DAILY    amLODIPine (NORVASC) tablet 5 mg  5 mg Oral DAILY    escitalopram oxalate (LEXAPRO) tablet 20 mg  20 mg Oral DAILY    levothyroxine (SYNTHROID) tablet 50 mcg  50 mcg Oral ACB    metoprolol tartrate (LOPRESSOR) tablet 25 mg  25 mg Oral BID    vancomycin (VANCOCIN) 1,250 mg in 0.9% sodium chloride (MBP/ADV) 250 mL  1,250 mg IntraVENous Q24H    cefepime (MAXIPIME) 2 g in 0.9% sodium chloride (MBP/ADV) 100 mL  2 g IntraVENous Q12H    sodium chloride (NS) flush 5-40 mL  5-40 mL IntraVENous Q8H    sodium chloride (NS) flush 5-40 mL  5-40 mL IntraVENous PRN        Lab Data Reviewed: (see below)  Lab Review:     No results for input(s): WBC, HGB, HCT, PLT, HGBEXT, HCTEXT, PLTEXT, HGBEXT, HCTEXT, PLTEXT in the last 72 hours.   Recent Labs     07/22/20  0534   CREA 0.94     No results found for: GLUCPOC  No results for input(s): PH, PCO2, PO2, HCO3, FIO2 in the last 72 hours. No results for input(s): INR, INREXT, INREXT in the last 72 hours. All Micro Results     Procedure Component Value Units Date/Time    CULTURE, Itzel Orona STAIN [[id number]]  (Abnormal)  (Susceptibility) Collected:  07/19/20 1428    Order Status:  Completed Specimen:  Wound from Leg Updated:  07/21/20 1435     Special Requests: NO SPECIAL REQUESTS        GRAM STAIN OCCASIONAL WBCS SEEN         NO ORGANISMS SEEN        Culture result:       HEAVY * METHICILLIN RESISTANT STAPHYLOCOCCUS AUREUS *                  MODERATE MIXED SKIN AURE ISOLATED          CULTURE, MRSA [[id number]]  (Abnormal) Collected:  07/19/20 1428    Order Status:  Completed Specimen:  Nasal from Nares Updated:  07/21/20 0804     Special Requests: NO SPECIAL REQUESTS        Culture result: MRSA PRESENT                   Screening of patient nares for MRSA is for surveillance purposes and, if positive, to facilitate isolation considerations in high risk settings. It is not intended for automatic decolonization interventions per se as regimens are not sufficiently effective to warrant routine use. CULTURE, Itzel Orona STAIN [[id number]] Collected:  07/19/20 1500    Order Status:  Canceled Specimen:  Wound Drainage     CULTURE, MRSA [[id number]] Collected:  07/19/20 1430    Order Status:  Canceled Specimen:  Nares     CULTURE, MRSA [[id number]] Collected:  07/18/20 1330    Order Status:  Canceled     CULTURE, MRSA [[id number]] Collected:  07/18/20 1231    Order Status:  Canceled Specimen:  Nasal     CULTURE, WOUND Jose Prost STAIN [[id number]]     Order Status:  Canceled Specimen:  Wound Drainage           I have reviewed notes of prior 24hr. Other pertinent lab:      Total time spent with patient: [address] 59 discussed with: Patient, Nursing Staff and >50% of time spent in counseling and coordination of care    Discussed:  Care Plan    Prophylaxis:  Lovenox    Disposition:  SNF/LTC           ___________________________________________________    Attending Physician: Osiel Rhodes MD

## 2020-07-22 NOTE — ROUTINE PROCESS
Bedside shift change report given to Sheela Harrison RN by Nacuii. Report included the following information SBAR, Kardex, MAR, Accordion, Recent Results and Med Rec Status.

## 2020-07-22 NOTE — PROGRESS NOTES
TRANSFER - OUT REPORT:    Verbal report given to  Young Cee tech (name) on [address]  being transferred to    [facility] [employer] [place]  (unit) for routine progression of care       Report consisted of patients Situation, Background, Assessment and   Recommendations(SBAR). Information from the following report(s) SBAR, Kardex, Intake/Output, MAR, Accordion, Recent Results and Med Rec Status was reviewed with the receiving nurse. Lines:       Opportunity for questions and clarification was provided.       Patient transported with: Daughter

## 2020-07-22 NOTE — DISCHARGE SUMMARY
Hospitalist Discharge Summary     Patient ID:    Nathalie Hill  [id number]  40 y.o.  1934    Admit date of service: 7/17/2020    Discharge date of service: 7/22/2020    Admission Diagnoses: Cellulitis [L03.90]    Chronic Diagnoses:    Problem List as of 7/22/2020 Never Reviewed          Codes Class Noted - Resolved    MRSA (methicillin resistant staph aureus) culture positive ICD-10-CM: Z22.322  ICD-9-CM: V02.54  7/22/2020 - Present        Cellulitis ICD-10-CM: L03.90  ICD-9-CM: 682.9  7/17/2020 - Present              Discharge Medications:   Current Discharge Medication List      START taking these medications    Details   doxycycline (ADOXA) 100 mg tablet Take 1 Tab by mouth two (2) times a day. Qty: 14 Tab, Refills: 0         CONTINUE these medications which have NOT CHANGED    Details   escitalopram oxalate (LEXAPRO) 20 mg tablet Take 20 mg by mouth daily. metoprolol tartrate (LOPRESSOR) 25 mg tablet Take 25 mg by mouth two (2) times a day. risperiDONE (RisperDAL) 0.25 mg tablet Take 0.25 mg by mouth. cholecalciferol (Vitamin D3) 25 mcg (1,000 unit) cap Take  by mouth daily. levothyroxine (SYNTHROID) 25 mcg tablet Take 50 mcg by mouth Daily (before breakfast). amLODIPine (NORVASC) 5 mg tablet Take 5 mg by mouth daily. Follow up Care:    1. Other, MD Kecia in 1-2 weeks  2. Diet:  Regular Diet    Disposition:  Home. Advanced Directive:    Discharge Exam:  See today's note. CONSULTATIONS: None    Significant Diagnostic Studies:   No results for input(s): WBC, HGB, HCT, PLT, HGBEXT, HCTEXT, PLTEXT in the last 72 hours. Recent Labs     07/22/20  0534   CREA 0.94     No results for input(s): ALT, AP, TBIL, TBILI, TP, ALB, GLOB, GGT, AML, LPSE in the last 72 hours. No lab exists for component: SGOT, GPT, AMYP, HLPSE  No results for input(s): INR, PTP, APTT, INREXT in the last 72 hours. No results for input(s): FE, TIBC, PSAT, FERR in the last 72 hours.    No results for input(s): PH, PCO2, PO2 in the last 72 hours. No results for input(s): CPK, CKMB in the last 72 hours. No lab exists for component: TROPONINI  No results found for: Afia 57:   1. Cellulitis of right lower extremity. Failed outpatient ABx treatment. On vancomycin. Will change ABx to doxy. Duplex lower extremity ultrasound negative for DVT. MRSA nasal swab is positive. wound culture shows MRSA. Continue wound care. SARS-CoV-2 is negative     2. Hypertension. Continue amlodipine and metoprolol     3. Hypothyroidism. Continue levothyroxine     4. Dementia/ delirium- supportive care. Patient resides at memory care unit in [facility]. Haldol PRN     5.  Depression and anxiety-Continue with Lexapro        Discharged in improved condition.     Spent 35 minutes    Signed:  Berry Gonzalez MD  7/22/2020  9:23 AM

## 2020-07-22 NOTE — PROGRESS NOTES
Bedside shift change report given to Gloria Sinclair RN (oncoming nurse) by Neva Schwartz (offgoing nurse). Report included the following information SBAR, Kardex, MAR, Accordion, Recent Results and Med Rec Status.

## 2020-07-22 NOTE — PROGRESS NOTES
7/22/2020   CARE MANAGEMENT NOTE:  CM reviewed EMR for clinical updates. Pt was admitted with cellulitis of right LE and failed outpt treatment.  She will discharge on p.o Doxy.  Also with dementia. MPOA Samantha Kavitha (c [phone]). Dtr Jorge Timmons  (c [phone])     POMALIA Son, [place] lives in   (H [phone])     RUR 11%: LOS 4 days     Transition Plan of Care:    1.  Pt will return to [facility] ([phone]). AVS, summary, today's MAR, RX, and Covid test (negative) was faxed to facility at [phone]. 2.  RN, please call report to [facility] [address] at [phone]). 3.  Home health wound care thru [facility] [address] has been arranged. Agency notified of pt's discharge today and AVS, summary faxed to them.   4  Dtr will transport pt via car at 2 p.m     No further needs indicated at this writing.   Owen

## 2020-07-22 NOTE — PROGRESS NOTES
Home Care Face to Face Encounter    Patients Name: Tim Mendoza    YOB: 1934    Primary Diagnosis: cellulitis of RT leg    Date of Face to Face:   07/22/20                                  Face to Face Encounter findings are related to primary reason for home care:   yes. 1. I certify that the patient needs intermittent care as follows: skilled nursing care:  wound care    2. I certify that this patient is homebound, that is: 1) patient requires the use of a walker device, special transportation, or assistance of another to leave the home; or 2) patient's condition makes leaving the home medically contraindicated; and 3) patient has a normal inability to leave the home and leaving the home requires considerable and taxing effort. Patient may leave the home for infrequent and short duration for medical reasons, and occasional absences for non-medical reasons. Homebound status is due to the following functional limitations: Patient's ambulation limited secondary to severe pain and requires the use of an assistive device and the assistance of a caregiver for safe completion. Patient with strength and ROM deficits limiting ambulation endurance requiring the use of an assistive device and the assistance of a caregiver. Patient deemed temporarily homebound secondary to increased risk for infection when leaving home and going out into the community. 3. I certify that this patient is under my care and that I, or a nurse practitioner or [phone], or clinical nurse specialist, or certified nurse midwife, working with me, had a Face-to-Face Encounter that meets the physician Face-to-Face Encounter requirements.   The following are the clinical findings from the [address] [place] [address] encounter that support the need for skilled services and is a summary of the encounter:      See discharge summary      Swapna Delgado MD  7/22/2020

## 2020-07-22 NOTE — DISCHARGE INSTRUCTIONS
ACUTE DIAGNOSES:  Cellulitis [L03.90]    CHRONIC MEDICAL DIAGNOSES:  Problem List as of 7/22/2020 Never Reviewed          Codes Class Noted - Resolved    MRSA (methicillin resistant staph aureus) culture positive ICD-10-CM: Z22.200  ICD-9-CM: V02.54  7/22/2020 - Present        Cellulitis ICD-10-CM: L03.90  ICD-9-CM: 682.9  7/17/2020 - Present              DISCHARGE MEDICATIONS:          · It is important that you take the medication exactly as they are prescribed. · Keep your medication in the bottles provided by the pharmacist and keep a list of the medication names, dosages, and times to be taken in your wallet. · Do not take other medications without consulting your doctor. DIET:  Regular Diet    ACTIVITY: Activity as tolerated    ADDITIONAL INFORMATION: If you experience any of the following symptoms then please call your primary care physician or return to the emergency room if you cannot get hold of your doctor: Fever, chills, nausea, vomiting, diarrhea, change in mentation, falling, bleeding, shortness of breath. FOLLOW UP CARE:  Kecia Ram MD  you are to call and set up an appointment to see them in 5 days. SKIN CARE   Daily with skin care apply BLUE TOP lotion to extremities and bony prominences for protection from friction/shear. Apply ORANGE TOP zinc barrier to the gluteal/ke-areas daily and as needed with moisture. Float heels off mattress. Have patient turn Q2h while in bed. Protect from lines and devices. When up in chair use a waffle cushion and reposition every 20 minutes. Information obtained by :  I understand that if any problems occur once I am at home I am to contact my physician. I understand and acknowledge receipt of the instructions indicated above.                                                                                                                                            Physician's or R.N.'s Signature Date/Time                                                                                                                                              Patient or Representative Signature                                                          Date/Time

## 2021-03-15 ENCOUNTER — APPOINTMENT (OUTPATIENT)
Dept: GENERAL RADIOLOGY | Age: 86
DRG: 640 | End: 2021-03-15
Attending: EMERGENCY MEDICINE
Payer: MEDICARE

## 2021-03-15 ENCOUNTER — HOSPITAL ENCOUNTER (INPATIENT)
Age: 86
LOS: 3 days | Discharge: NURSING, REHAB OR LONG-TERM CARE | DRG: 640 | End: 2021-03-18
Attending: EMERGENCY MEDICINE | Admitting: INTERNAL MEDICINE
Payer: MEDICARE

## 2021-03-15 ENCOUNTER — APPOINTMENT (OUTPATIENT)
Dept: CT IMAGING | Age: 86
DRG: 640 | End: 2021-03-15
Attending: EMERGENCY MEDICINE
Payer: MEDICARE

## 2021-03-15 DIAGNOSIS — R77.8 ELEVATED TROPONIN: ICD-10-CM

## 2021-03-15 DIAGNOSIS — R41.82 ALTERED MENTAL STATUS, UNSPECIFIED ALTERED MENTAL STATUS TYPE: Primary | ICD-10-CM

## 2021-03-15 DIAGNOSIS — Z71.89 ADVANCED CARE PLANNING/COUNSELING DISCUSSION: ICD-10-CM

## 2021-03-15 DIAGNOSIS — N39.0 URINARY TRACT INFECTION WITHOUT HEMATURIA, SITE UNSPECIFIED: ICD-10-CM

## 2021-03-15 DIAGNOSIS — Z71.89 GOALS OF CARE, COUNSELING/DISCUSSION: ICD-10-CM

## 2021-03-15 DIAGNOSIS — Z71.89 DNR (DO NOT RESUSCITATE) DISCUSSION: ICD-10-CM

## 2021-03-15 DIAGNOSIS — R52 PAIN: ICD-10-CM

## 2021-03-15 DIAGNOSIS — R53.81 DEBILITY: ICD-10-CM

## 2021-03-15 PROBLEM — G93.40 ACUTE ENCEPHALOPATHY: Status: ACTIVE | Noted: 2021-03-15

## 2021-03-15 PROBLEM — E03.9 ACQUIRED HYPOTHYROIDISM: Status: ACTIVE | Noted: 2021-03-15

## 2021-03-15 PROBLEM — I10 HTN (HYPERTENSION): Status: ACTIVE | Noted: 2021-03-15

## 2021-03-15 PROBLEM — R41.0 CONFUSION: Status: ACTIVE | Noted: 2021-03-15

## 2021-03-15 PROBLEM — F03.90 DEMENTIA (HCC): Status: ACTIVE | Noted: 2021-03-15

## 2021-03-15 LAB
ALBUMIN SERPL-MCNC: 3.3 G/DL (ref 3.5–5)
ALBUMIN/GLOB SERPL: 1 {RATIO} (ref 1.1–2.2)
ALP SERPL-CCNC: 81 U/L (ref 45–117)
ALT SERPL-CCNC: 24 U/L (ref 12–78)
AMPHET UR QL SCN: NEGATIVE
ANION GAP SERPL CALC-SCNC: 8 MMOL/L (ref 5–15)
APPEARANCE UR: CLEAR
AST SERPL-CCNC: 22 U/L (ref 15–37)
BACTERIA URNS QL MICRO: NEGATIVE /HPF
BARBITURATES UR QL SCN: NEGATIVE
BASOPHILS # BLD: 0.1 K/UL (ref 0–0.1)
BASOPHILS NFR BLD: 1 % (ref 0–1)
BENZODIAZ UR QL: NEGATIVE
BILIRUB SERPL-MCNC: 0.5 MG/DL (ref 0.2–1)
BILIRUB UR QL: NEGATIVE
BUN SERPL-MCNC: 35 MG/DL (ref 6–20)
BUN/CREAT SERPL: 38 (ref 12–20)
CALCIUM SERPL-MCNC: 8.9 MG/DL (ref 8.5–10.1)
CANNABINOIDS UR QL SCN: NEGATIVE
CHLORIDE SERPL-SCNC: 105 MMOL/L (ref 97–108)
CO2 SERPL-SCNC: 23 MMOL/L (ref 21–32)
COCAINE UR QL SCN: NEGATIVE
COLOR UR: ABNORMAL
CREAT SERPL-MCNC: 0.92 MG/DL (ref 0.55–1.02)
DIFFERENTIAL METHOD BLD: ABNORMAL
DRUG SCRN COMMENT,DRGCM: NORMAL
EOSINOPHIL # BLD: 0.1 K/UL (ref 0–0.4)
EOSINOPHIL NFR BLD: 1 % (ref 0–7)
EPITH CASTS URNS QL MICRO: ABNORMAL /LPF
ERYTHROCYTE [DISTWIDTH] IN BLOOD BY AUTOMATED COUNT: 13 % (ref 11.5–14.5)
GLOBULIN SER CALC-MCNC: 3.3 G/DL (ref 2–4)
GLUCOSE SERPL-MCNC: 95 MG/DL (ref 65–100)
GLUCOSE UR STRIP.AUTO-MCNC: NEGATIVE MG/DL
HCT VFR BLD AUTO: 35 % (ref 35–47)
HGB BLD-MCNC: 10.8 G/DL (ref 11.5–16)
HGB UR QL STRIP: ABNORMAL
HYALINE CASTS URNS QL MICRO: ABNORMAL /LPF (ref 0–5)
IMM GRANULOCYTES # BLD AUTO: 0 K/UL (ref 0–0.04)
IMM GRANULOCYTES NFR BLD AUTO: 0 % (ref 0–0.5)
KETONES UR QL STRIP.AUTO: 15 MG/DL
LEUKOCYTE ESTERASE UR QL STRIP.AUTO: ABNORMAL
LYMPHOCYTES # BLD: 0.9 K/UL (ref 0.8–3.5)
LYMPHOCYTES NFR BLD: 10 % (ref 12–49)
MCH RBC QN AUTO: 28.2 PG (ref 26–34)
MCHC RBC AUTO-ENTMCNC: 30.9 G/DL (ref 30–36.5)
MCV RBC AUTO: 91.4 FL (ref 80–99)
METHADONE UR QL: NEGATIVE
MONOCYTES # BLD: 1.2 K/UL (ref 0–1)
MONOCYTES NFR BLD: 13 % (ref 5–13)
NEUTS SEG # BLD: 7 K/UL (ref 1.8–8)
NEUTS SEG NFR BLD: 75 % (ref 32–75)
NITRITE UR QL STRIP.AUTO: NEGATIVE
NRBC # BLD: 0 K/UL (ref 0–0.01)
NRBC BLD-RTO: 0 PER 100 WBC
OPIATES UR QL: NEGATIVE
PCP UR QL: NEGATIVE
PH UR STRIP: 6 [PH] (ref 5–8)
PLATELET # BLD AUTO: 255 K/UL (ref 150–400)
PMV BLD AUTO: 10 FL (ref 8.9–12.9)
POTASSIUM SERPL-SCNC: 4.1 MMOL/L (ref 3.5–5.1)
PROT SERPL-MCNC: 6.6 G/DL (ref 6.4–8.2)
PROT UR STRIP-MCNC: NEGATIVE MG/DL
RBC # BLD AUTO: 3.83 M/UL (ref 3.8–5.2)
RBC #/AREA URNS HPF: ABNORMAL /HPF (ref 0–5)
SODIUM SERPL-SCNC: 136 MMOL/L (ref 136–145)
SP GR UR REFRACTOMETRY: 1.01 (ref 1–1.03)
TROPONIN I SERPL-MCNC: 0.08 NG/ML
TSH SERPL DL<=0.05 MIU/L-ACNC: 3.25 UIU/ML (ref 0.36–3.74)
UR CULT HOLD, URHOLD: NORMAL
UROBILINOGEN UR QL STRIP.AUTO: 0.2 EU/DL (ref 0.2–1)
WBC # BLD AUTO: 9.4 K/UL (ref 3.6–11)
WBC URNS QL MICRO: ABNORMAL /HPF (ref 0–4)

## 2021-03-15 PROCEDURE — 87086 URINE CULTURE/COLONY COUNT: CPT

## 2021-03-15 PROCEDURE — 80307 DRUG TEST PRSMV CHEM ANLYZR: CPT

## 2021-03-15 PROCEDURE — 74011000250 HC RX REV CODE- 250: Performed by: EMERGENCY MEDICINE

## 2021-03-15 PROCEDURE — 85025 COMPLETE CBC W/AUTO DIFF WBC: CPT

## 2021-03-15 PROCEDURE — 70450 CT HEAD/BRAIN W/O DYE: CPT

## 2021-03-15 PROCEDURE — 71045 X-RAY EXAM CHEST 1 VIEW: CPT

## 2021-03-15 PROCEDURE — 99285 EMERGENCY DEPT VISIT HI MDM: CPT

## 2021-03-15 PROCEDURE — 65270000029 HC RM PRIVATE

## 2021-03-15 PROCEDURE — 77030038269 HC DRN EXT URIN PURWCK BARD -A

## 2021-03-15 PROCEDURE — 36415 COLL VENOUS BLD VENIPUNCTURE: CPT

## 2021-03-15 PROCEDURE — 77030019905 HC CATH URETH INTMIT MDII -A

## 2021-03-15 PROCEDURE — 80053 COMPREHEN METABOLIC PANEL: CPT

## 2021-03-15 PROCEDURE — 74011250636 HC RX REV CODE- 250/636: Performed by: EMERGENCY MEDICINE

## 2021-03-15 PROCEDURE — 74011250636 HC RX REV CODE- 250/636: Performed by: INTERNAL MEDICINE

## 2021-03-15 PROCEDURE — 84443 ASSAY THYROID STIM HORMONE: CPT

## 2021-03-15 PROCEDURE — 81001 URINALYSIS AUTO W/SCOPE: CPT

## 2021-03-15 PROCEDURE — 93005 ELECTROCARDIOGRAM TRACING: CPT

## 2021-03-15 PROCEDURE — 84484 ASSAY OF TROPONIN QUANT: CPT

## 2021-03-15 RX ORDER — POLYETHYLENE GLYCOL 3350 17 G/17G
17 POWDER, FOR SOLUTION ORAL DAILY PRN
Status: DISCONTINUED | OUTPATIENT
Start: 2021-03-15 | End: 2021-03-18 | Stop reason: HOSPADM

## 2021-03-15 RX ORDER — HALOPERIDOL 5 MG/ML
2 INJECTION INTRAMUSCULAR
Status: DISCONTINUED | OUTPATIENT
Start: 2021-03-15 | End: 2021-03-17

## 2021-03-15 RX ORDER — SERTRALINE HYDROCHLORIDE 100 MG/1
150 TABLET, FILM COATED ORAL DAILY
COMMUNITY

## 2021-03-15 RX ORDER — GENTAMICIN SULFATE 1 MG/G
OINTMENT TOPICAL 3 TIMES DAILY
COMMUNITY

## 2021-03-15 RX ORDER — ACETAMINOPHEN 650 MG/1
650 SUPPOSITORY RECTAL
Status: DISCONTINUED | OUTPATIENT
Start: 2021-03-15 | End: 2021-03-18 | Stop reason: HOSPADM

## 2021-03-15 RX ORDER — SODIUM CHLORIDE 9 MG/ML
100 INJECTION, SOLUTION INTRAVENOUS CONTINUOUS
Status: DISCONTINUED | OUTPATIENT
Start: 2021-03-15 | End: 2021-03-18 | Stop reason: HOSPADM

## 2021-03-15 RX ORDER — SODIUM CHLORIDE 0.9 % (FLUSH) 0.9 %
5-40 SYRINGE (ML) INJECTION AS NEEDED
Status: DISCONTINUED | OUTPATIENT
Start: 2021-03-15 | End: 2021-03-18 | Stop reason: HOSPADM

## 2021-03-15 RX ORDER — PROMETHAZINE HYDROCHLORIDE 25 MG/1
12.5 TABLET ORAL
Status: DISCONTINUED | OUTPATIENT
Start: 2021-03-15 | End: 2021-03-18 | Stop reason: HOSPADM

## 2021-03-15 RX ORDER — LORAZEPAM 0.5 MG/1
0.25 TABLET ORAL
COMMUNITY

## 2021-03-15 RX ORDER — TRIAMCINOLONE ACETONIDE 0.25 MG/G
CREAM TOPICAL EVERY OTHER DAY
COMMUNITY

## 2021-03-15 RX ORDER — THERA TABS 400 MCG
1 TAB ORAL DAILY
COMMUNITY

## 2021-03-15 RX ORDER — ACETAMINOPHEN 325 MG/1
650 TABLET ORAL
Status: DISCONTINUED | OUTPATIENT
Start: 2021-03-15 | End: 2021-03-18 | Stop reason: HOSPADM

## 2021-03-15 RX ORDER — ONDANSETRON 2 MG/ML
4 INJECTION INTRAMUSCULAR; INTRAVENOUS
Status: DISCONTINUED | OUTPATIENT
Start: 2021-03-15 | End: 2021-03-18 | Stop reason: HOSPADM

## 2021-03-15 RX ORDER — MUPIROCIN 20 MG/G
OINTMENT TOPICAL 3 TIMES DAILY
COMMUNITY

## 2021-03-15 RX ORDER — SODIUM CHLORIDE 0.9 % (FLUSH) 0.9 %
5-40 SYRINGE (ML) INJECTION EVERY 8 HOURS
Status: DISCONTINUED | OUTPATIENT
Start: 2021-03-15 | End: 2021-03-18 | Stop reason: HOSPADM

## 2021-03-15 RX ORDER — ACETAMINOPHEN 325 MG/1
325 TABLET ORAL
COMMUNITY

## 2021-03-15 RX ORDER — ENOXAPARIN SODIUM 100 MG/ML
40 INJECTION SUBCUTANEOUS DAILY
Status: DISCONTINUED | OUTPATIENT
Start: 2021-03-16 | End: 2021-03-18 | Stop reason: HOSPADM

## 2021-03-15 RX ADMIN — CEFTRIAXONE 1 G: 1 INJECTION, POWDER, FOR SOLUTION INTRAMUSCULAR; INTRAVENOUS at 22:38

## 2021-03-15 RX ADMIN — SODIUM CHLORIDE 75 ML/HR: 9 INJECTION, SOLUTION INTRAVENOUS at 22:41

## 2021-03-15 NOTE — ED TRIAGE NOTES
Pt arrives via EMS from [facility] w/ c/c of weakness/fatigue/\"not talking as much. \" Facility reported a fall 2 days ago, Pt c/o RUE pain post-fall. Recent COVID testing -. Hx of dementia.

## 2021-03-15 NOTE — Clinical Note
Status[de-identified] INPATIENT [101]   Type of Bed: Telemetry [19]   Inpatient Hospitalization Certified Necessary for the Following Reasons: 3.  Patient receiving treatment that can only be provided in an inpatient setting (further clarification in H&P documentation)   Admitting Diagnosis: Acute encephalopathy [1653142]   Admitting Physician: Mariella Gooden, [address]   Attending Physician: Obi Ruby   Estimated Length of Stay: 3-4 Midnights   Discharge Plan[de-identified] Home with Office Follow-up

## 2021-03-16 ENCOUNTER — APPOINTMENT (OUTPATIENT)
Dept: NON INVASIVE DIAGNOSTICS | Age: 86
DRG: 640 | End: 2021-03-16
Attending: INTERNAL MEDICINE
Payer: MEDICARE

## 2021-03-16 ENCOUNTER — APPOINTMENT (OUTPATIENT)
Dept: GENERAL RADIOLOGY | Age: 86
DRG: 640 | End: 2021-03-16
Attending: INTERNAL MEDICINE
Payer: MEDICARE

## 2021-03-16 LAB
ANION GAP SERPL CALC-SCNC: 4 MMOL/L (ref 5–15)
BUN SERPL-MCNC: 34 MG/DL (ref 6–20)
BUN/CREAT SERPL: 39 (ref 12–20)
CALCIUM SERPL-MCNC: 8.4 MG/DL (ref 8.5–10.1)
CHLORIDE SERPL-SCNC: 107 MMOL/L (ref 97–108)
CHOLEST SERPL-MCNC: 166 MG/DL
CO2 SERPL-SCNC: 28 MMOL/L (ref 21–32)
CREAT SERPL-MCNC: 0.88 MG/DL (ref 0.55–1.02)
ECHO AO ROOT DIAM: 2.99 CM
ECHO AV AREA PEAK VELOCITY: 1.05 CM2
ECHO AV AREA PEAK VELOCITY: 1.08 CM2
ECHO AV AREA VTI: 1.14 CM2
ECHO AV AREA/BSA VTI: 0.6 CM2/M2
ECHO AV MEAN GRADIENT: 15.58 MMHG
ECHO AV PEAK GRADIENT: 30.18 MMHG
ECHO AV PEAK VELOCITY: 274.7 CM/S
ECHO AV VTI: 53.42 CM
ECHO LA AREA 4C: 28.57 CM2
ECHO LA MAJOR AXIS: 3.35 CM
ECHO LA MINOR AXIS: 1.9 CM
ECHO LA VOL 2C: 89.63 ML (ref 22–52)
ECHO LA VOL 4C: 109.82 ML (ref 22–52)
ECHO LA VOL BP: 108.46 ML (ref 22–52)
ECHO LA VOL/BSA BIPLANE: 61.67 ML/M2 (ref 16–28)
ECHO LA VOLUME INDEX A2C: 50.96 ML/M2 (ref 16–28)
ECHO LA VOLUME INDEX A4C: 62.44 ML/M2 (ref 16–28)
ECHO LV E' LATERAL VELOCITY: 11.1 CENTIMETER/SECOND
ECHO LV E' SEPTAL VELOCITY: 7.26 CENTIMETER/SECOND
ECHO LV INTERNAL DIMENSION DIASTOLIC: 5.3 CM (ref 3.9–5.3)
ECHO LV INTERNAL DIMENSION SYSTOLIC: 3.59 CM
ECHO LV IVSD: 0.95 CM (ref 0.6–0.9)
ECHO LV MASS 2D: 173.3 G (ref 67–162)
ECHO LV MASS INDEX 2D: 98.5 G/M2 (ref 43–95)
ECHO LV POSTERIOR WALL DIASTOLIC: 0.84 CM (ref 0.6–0.9)
ECHO LVOT DIAM: 1.87 CM
ECHO LVOT PEAK GRADIENT: 4.45 MMHG
ECHO LVOT PEAK GRADIENT: 6.52 MMHG
ECHO LVOT PEAK VELOCITY: 105.45 CM/S
ECHO LVOT PEAK VELOCITY: 107.62 CM/S
ECHO LVOT SV: 61 ML
ECHO LVOT VTI: 22.22 CM
ECHO MV A VELOCITY: 121.9 CENTIMETER/SECOND
ECHO MV AREA VTI: 1.34 CM2
ECHO MV E DECELERATION TIME (DT): 206.48 MS
ECHO MV E VELOCITY: 117.9 CENTIMETER/SECOND
ECHO MV MAX VELOCITY: 137.74 CM/S
ECHO MV MEAN GRADIENT: 4.63 MMHG
ECHO MV PEAK GRADIENT: 7.59 MMHG
ECHO MV VTI: 45.58 CM
ECHO PV PEAK INSTANTANEOUS GRADIENT SYSTOLIC: 1.88 MMHG
ECHO PV REGURGITANT MAX VELOCITY: 68.56 CM/S
ECHO RV INTERNAL DIMENSION: 2.94 CM
ECHO RV TAPSE: 2.32 CM (ref 1.5–2)
ECHO TV REGURGITANT MAX VELOCITY: 339.53 CM/S
ECHO TV REGURGITANT PEAK GRADIENT: 46.11 MMHG
ERYTHROCYTE [DISTWIDTH] IN BLOOD BY AUTOMATED COUNT: 13.2 % (ref 11.5–14.5)
GLUCOSE SERPL-MCNC: 108 MG/DL (ref 65–100)
HCT VFR BLD AUTO: 29.1 % (ref 35–47)
HDLC SERPL-MCNC: 65 MG/DL
HDLC SERPL: 2.6 {RATIO} (ref 0–5)
HGB BLD-MCNC: 9.5 G/DL (ref 11.5–16)
LA VOL DISK BP: 98.9 ML (ref 22–52)
LDLC SERPL CALC-MCNC: 90.4 MG/DL (ref 0–100)
LIPID PROFILE,FLP: NORMAL
LVOT MG: 2.47 MMHG
MCH RBC QN AUTO: 28.7 PG (ref 26–34)
MCHC RBC AUTO-ENTMCNC: 32.6 G/DL (ref 30–36.5)
MCV RBC AUTO: 87.9 FL (ref 80–99)
NRBC # BLD: 0 K/UL (ref 0–0.01)
NRBC BLD-RTO: 0 PER 100 WBC
PLATELET # BLD AUTO: 245 K/UL (ref 150–400)
PMV BLD AUTO: 9.9 FL (ref 8.9–12.9)
POTASSIUM SERPL-SCNC: 4.1 MMOL/L (ref 3.5–5.1)
RBC # BLD AUTO: 3.31 M/UL (ref 3.8–5.2)
SODIUM SERPL-SCNC: 139 MMOL/L (ref 136–145)
TRIGL SERPL-MCNC: 53 MG/DL (ref ?–150)
TROPONIN I SERPL-MCNC: 0.12 NG/ML
TROPONIN I SERPL-MCNC: 0.13 NG/ML
VLDLC SERPL CALC-MCNC: 10.6 MG/DL
WBC # BLD AUTO: 9.2 K/UL (ref 3.6–11)

## 2021-03-16 PROCEDURE — 93306 TTE W/DOPPLER COMPLETE: CPT | Performed by: SPECIALIST

## 2021-03-16 PROCEDURE — 74011250636 HC RX REV CODE- 250/636: Performed by: INTERNAL MEDICINE

## 2021-03-16 PROCEDURE — 73030 X-RAY EXAM OF SHOULDER: CPT

## 2021-03-16 PROCEDURE — 99223 1ST HOSP IP/OBS HIGH 75: CPT | Performed by: SPECIALIST

## 2021-03-16 PROCEDURE — 80061 LIPID PANEL: CPT

## 2021-03-16 PROCEDURE — 36415 COLL VENOUS BLD VENIPUNCTURE: CPT

## 2021-03-16 PROCEDURE — 93306 TTE W/DOPPLER COMPLETE: CPT

## 2021-03-16 PROCEDURE — 99356 PR PROLONGED SVC I/P OR OBS SETTING 1ST HOUR: CPT | Performed by: NURSE PRACTITIONER

## 2021-03-16 PROCEDURE — 73090 X-RAY EXAM OF FOREARM: CPT

## 2021-03-16 PROCEDURE — 85027 COMPLETE CBC AUTOMATED: CPT

## 2021-03-16 PROCEDURE — 80048 BASIC METABOLIC PNL TOTAL CA: CPT

## 2021-03-16 PROCEDURE — 74011250636 HC RX REV CODE- 250/636: Performed by: HOSPITALIST

## 2021-03-16 PROCEDURE — 99223 1ST HOSP IP/OBS HIGH 75: CPT | Performed by: NURSE PRACTITIONER

## 2021-03-16 PROCEDURE — 73060 X-RAY EXAM OF HUMERUS: CPT

## 2021-03-16 PROCEDURE — 74011000250 HC RX REV CODE- 250: Performed by: INTERNAL MEDICINE

## 2021-03-16 PROCEDURE — 74011250637 HC RX REV CODE- 250/637: Performed by: INTERNAL MEDICINE

## 2021-03-16 PROCEDURE — 84484 ASSAY OF TROPONIN QUANT: CPT

## 2021-03-16 PROCEDURE — 65660000000 HC RM CCU STEPDOWN

## 2021-03-16 RX ORDER — ACETAMINOPHEN 325 MG/1
650 TABLET ORAL EVERY 6 HOURS
Status: DISPENSED | OUTPATIENT
Start: 2021-03-16 | End: 2021-03-18

## 2021-03-16 RX ORDER — OXYCODONE HYDROCHLORIDE 5 MG/1
10 TABLET ORAL
Status: DISCONTINUED | OUTPATIENT
Start: 2021-03-16 | End: 2021-03-16

## 2021-03-16 RX ORDER — LABETALOL HCL 20 MG/4 ML
20 SYRINGE (ML) INTRAVENOUS
Status: DISCONTINUED | OUTPATIENT
Start: 2021-03-16 | End: 2021-03-18 | Stop reason: HOSPADM

## 2021-03-16 RX ORDER — MORPHINE SULFATE 2 MG/ML
2 INJECTION, SOLUTION INTRAMUSCULAR; INTRAVENOUS
Status: DISCONTINUED | OUTPATIENT
Start: 2021-03-16 | End: 2021-03-16

## 2021-03-16 RX ORDER — METOPROLOL TARTRATE 25 MG/1
25 TABLET, FILM COATED ORAL 2 TIMES DAILY
Status: DISCONTINUED | OUTPATIENT
Start: 2021-03-16 | End: 2021-03-18 | Stop reason: HOSPADM

## 2021-03-16 RX ORDER — SERTRALINE HYDROCHLORIDE 50 MG/1
150 TABLET, FILM COATED ORAL DAILY
Status: DISCONTINUED | OUTPATIENT
Start: 2021-03-16 | End: 2021-03-18 | Stop reason: HOSPADM

## 2021-03-16 RX ORDER — LORAZEPAM 0.5 MG/1
0.25 TABLET ORAL
Status: DISCONTINUED | OUTPATIENT
Start: 2021-03-16 | End: 2021-03-18 | Stop reason: HOSPADM

## 2021-03-16 RX ORDER — CALCIUM CARB/MAGNESIUM CARB 311-232MG
5 TABLET ORAL
Status: DISCONTINUED | OUTPATIENT
Start: 2021-03-16 | End: 2021-03-18 | Stop reason: HOSPADM

## 2021-03-16 RX ORDER — SIMETHICONE 80 MG
80 TABLET,CHEWABLE ORAL
Status: DISCONTINUED | OUTPATIENT
Start: 2021-03-16 | End: 2021-03-18 | Stop reason: HOSPADM

## 2021-03-16 RX ORDER — DIPHENHYDRAMINE HCL 25 MG
25 CAPSULE ORAL
Status: DISCONTINUED | OUTPATIENT
Start: 2021-03-16 | End: 2021-03-18 | Stop reason: HOSPADM

## 2021-03-16 RX ORDER — LORAZEPAM 2 MG/ML
0.5 INJECTION INTRAMUSCULAR
Status: DISCONTINUED | OUTPATIENT
Start: 2021-03-16 | End: 2021-03-18 | Stop reason: HOSPADM

## 2021-03-16 RX ORDER — GUAIFENESIN 100 MG/5ML
81 LIQUID (ML) ORAL DAILY
Status: DISCONTINUED | OUTPATIENT
Start: 2021-03-16 | End: 2021-03-18 | Stop reason: HOSPADM

## 2021-03-16 RX ORDER — MORPHINE SULFATE 2 MG/ML
1 INJECTION, SOLUTION INTRAMUSCULAR; INTRAVENOUS
Status: DISCONTINUED | OUTPATIENT
Start: 2021-03-16 | End: 2021-03-18 | Stop reason: HOSPADM

## 2021-03-16 RX ORDER — RISPERIDONE 0.25 MG/1
0.25 TABLET, FILM COATED ORAL 2 TIMES DAILY
Status: DISCONTINUED | OUTPATIENT
Start: 2021-03-16 | End: 2021-03-18 | Stop reason: HOSPADM

## 2021-03-16 RX ORDER — ALBUTEROL SULFATE 0.83 MG/ML
2.5 SOLUTION RESPIRATORY (INHALATION)
Status: DISCONTINUED | OUTPATIENT
Start: 2021-03-16 | End: 2021-03-18 | Stop reason: HOSPADM

## 2021-03-16 RX ORDER — AMLODIPINE BESYLATE 2.5 MG/1
2.5 TABLET ORAL DAILY
Status: DISCONTINUED | OUTPATIENT
Start: 2021-03-16 | End: 2021-03-18 | Stop reason: HOSPADM

## 2021-03-16 RX ORDER — OXYCODONE HYDROCHLORIDE 5 MG/1
5 TABLET ORAL
Status: DISCONTINUED | OUTPATIENT
Start: 2021-03-16 | End: 2021-03-18 | Stop reason: HOSPADM

## 2021-03-16 RX ORDER — DIPHENHYDRAMINE HYDROCHLORIDE 50 MG/ML
25 INJECTION, SOLUTION INTRAMUSCULAR; INTRAVENOUS
Status: DISCONTINUED | OUTPATIENT
Start: 2021-03-16 | End: 2021-03-18 | Stop reason: HOSPADM

## 2021-03-16 RX ORDER — LEVOTHYROXINE SODIUM 50 UG/1
50 TABLET ORAL
Status: DISCONTINUED | OUTPATIENT
Start: 2021-03-16 | End: 2021-03-18 | Stop reason: HOSPADM

## 2021-03-16 RX ORDER — HYDRALAZINE HYDROCHLORIDE 20 MG/ML
20 INJECTION INTRAMUSCULAR; INTRAVENOUS
Status: DISCONTINUED | OUTPATIENT
Start: 2021-03-16 | End: 2021-03-18 | Stop reason: HOSPADM

## 2021-03-16 RX ADMIN — Medication 1 CAPSULE: at 09:00

## 2021-03-16 RX ADMIN — ENOXAPARIN SODIUM 40 MG: 40 INJECTION SUBCUTANEOUS at 08:20

## 2021-03-16 RX ADMIN — LORAZEPAM 0.5 MG: 2 INJECTION, SOLUTION INTRAMUSCULAR; INTRAVENOUS at 17:45

## 2021-03-16 RX ADMIN — AMLODIPINE BESYLATE 2.5 MG: 5 TABLET ORAL at 12:40

## 2021-03-16 RX ADMIN — Medication 10 ML: at 18:11

## 2021-03-16 RX ADMIN — CEFTRIAXONE 1 G: 1 INJECTION, POWDER, FOR SOLUTION INTRAMUSCULAR; INTRAVENOUS at 21:40

## 2021-03-16 RX ADMIN — ASPIRIN 81 MG: 81 TABLET, CHEWABLE ORAL at 12:40

## 2021-03-16 RX ADMIN — Medication 10 ML: at 22:10

## 2021-03-16 RX ADMIN — LEVOTHYROXINE SODIUM 50 MCG: 0.05 TABLET ORAL at 12:40

## 2021-03-16 RX ADMIN — Medication 10 ML: at 21:40

## 2021-03-16 RX ADMIN — METOPROLOL TARTRATE 25 MG: 25 TABLET, FILM COATED ORAL at 12:36

## 2021-03-16 RX ADMIN — HALOPERIDOL LACTATE 2 MG: 5 INJECTION, SOLUTION INTRAMUSCULAR at 16:39

## 2021-03-16 RX ADMIN — LORAZEPAM 0.25 MG: 0.5 TABLET ORAL at 12:34

## 2021-03-16 RX ADMIN — SERTRALINE 150 MG: 50 TABLET, FILM COATED ORAL at 12:39

## 2021-03-16 RX ADMIN — Medication 10 ML: at 04:58

## 2021-03-16 RX ADMIN — DIPHENHYDRAMINE HYDROCHLORIDE 25 MG: 50 INJECTION INTRAMUSCULAR; INTRAVENOUS at 04:48

## 2021-03-16 RX ADMIN — RISPERIDONE 0.25 MG: 0.25 TABLET ORAL at 09:26

## 2021-03-16 RX ADMIN — HYDRALAZINE HYDROCHLORIDE 20 MG: 20 INJECTION INTRAMUSCULAR; INTRAVENOUS at 19:57

## 2021-03-16 RX ADMIN — SODIUM CHLORIDE 75 ML/HR: 9 INJECTION, SOLUTION INTRAVENOUS at 16:37

## 2021-03-16 RX ADMIN — PSYLLIUM HUSK 1 PACKET: 3.4 POWDER ORAL at 09:26

## 2021-03-16 NOTE — H&P
[address]  [phone]    Admission History and Physical      NAME:  Arabella Penaloza   :   1934   MRN:  [id number]     PCP:  Dennise Isaac MD     Date of service:  3/15/2021         Subjective:     CHIEF COMPLAINT: Fall, confusion, lethargy    HISTORY OF PRESENT ILLNESS:     Ms. Juany Aguiar is a 80 y.o.  female who is admitted with worsening confusion. Ms. Juany Aguiar with past medical history of advanced dementia, resides in a memory center, hypertension, breast cancer, hypothyroidism presented to ER a fall 2 days ago with right arm injury. Since then patient has been more confused, lethargic and noncommunicating as he used to be. Daughter stated that no fever, diarrhea, cough, nausea, vomiting. Past Medical History:   Diagnosis Date    Cancer ([address]     beast CA    Endocrine disease     hypothyroid    Hypertension         Past Surgical History:   Procedure Laterality Date    BREAST SURGERY PROCEDURE UNLISTED      right mastectomy       Social History     Tobacco Use    Smoking status: Former Smoker    Smokeless tobacco: Never Used   Substance Use Topics    Alcohol use: Not Currently        No family history on file. Allergies   Allergen Reactions    Sulfa (Sulfonamide Antibiotics) Anaphylaxis    Neosporin [Hydrocortisone] Hives        Prior to Admission medications    Medication Sig Start Date End Date Taking? Authorizing Provider   doxycycline (ADOXA) 100 mg tablet Take 1 Tab by mouth two (2) times a day. 20   Mack Leung MD   escitalopram oxalate (LEXAPRO) 20 mg tablet Take 20 mg by mouth daily. Kecia Sotelo MD   metoprolol tartrate (LOPRESSOR) 25 mg tablet Take 25 mg by mouth two (2) times a day. Kecia Sotelo MD   risperiDONE (RisperDAL) 0.25 mg tablet Take 0.25 mg by mouth. Kecia Sotelo MD   cholecalciferol (Vitamin D3) 25 mcg (1,000 unit) cap Take  by mouth daily.     Kecia Sotelo MD levothyroxine (SYNTHROID) 25 mcg tablet Take 50 mcg by mouth Daily (before breakfast). Kecia Sotelo MD   amLODIPine (NORVASC) 5 mg tablet Take 5 mg by mouth daily. Kecia Sotelo MD         Review of Systems:  (bold if positive, if negative)    Unable to provide history       Objective:      VITALS:    Vital signs reviewed; most recent are:    Visit Vitals  BP (!) 144/70   Pulse 89   Temp 98.5 °F (36.9 °C)   Resp 18   Ht 5' 3\" (1.6 m)   Wt 72.6 kg (160 lb 0.8 oz)   SpO2 94%   BMI 28.35 kg/m²     SpO2 Readings from Last 6 Encounters:   03/15/21 94%   07/22/20 98%   06/05/19 95%   02/28/13 97%        No intake or output data in the 24 hours ending 03/15/21 2200         Exam:     Physical Exam:    Gen:  Well-developed, well-nourished, in no acute distress  HEENT:  Pink conjunctivae, PERRL, hearing intact to voice, dry mucous membranes  Neck:  Supple, without masses, thyroid non-tender  Resp:  No accessory muscle use, clear breath sounds without wheezes rales or rhonchi  Card:  Grade 3/6systolic murmurs, normal S1, S2 without thrills, bruits or peripheral edema  Abd:  Soft, non-tender, non-distended, normoactive bowel sounds are present, no palpable organomegaly and no detectable hernias  Lymph:  No cervical or inguinal adenopathy  Musc:  No cyanosis or clubbing  Skin:  No rashes or ulcers, skin turgor is good  Neuro:  Cranial nerves are grossly intact, no focal motor weakness, follows commands appropriately  Psych: Poor insight,         Labs:    Recent Labs     03/15/21  1757   WBC 9.4   HGB 10.8*   HCT 35.0        Recent Labs     03/15/21  1757      K 4.1      CO2 23   GLU 95   BUN 35*   CREA 0.92   CA 8.9   ALB 3.3*   TBILI 0.5   ALT 24     No results found for: GLUCPOC  No results for input(s): PH, PCO2, PO2, HCO3, FIO2 in the last 72 hours. No results for input(s): INR, INREXT in the last 72 hours. Telemetry reviewed:         Assessment/Plan:    1.   Acute encephalopathy (3/15/2021)/ Confusion (3/15/2021)/advanced dementia ([facility] [address] Possible due to dehydration. Patient looks clinically dehydrated and her BUN is elevated. Ordered IV fluid and monitor. Her confusion likely will get worse in the hospital and needs a sitter. 2.  Pyuria/trace LE(POA). Empirically start ceftriaxone until urine culture is back. 3.  HTN (hypertension) (3/15/2021). Continue home medication    4. Acquired hypothyroidism (3/15/2021). Continue Synthroid and check TSH    5. Elevated troponin (3/15/2021). No chest pain. Check serial troponin. If worsening consult cardiology.     CODE STATUS full(discussed with daughter at the bedside and she agrees that her mother be DNR, but she will discuss with her brother who is POA)        Previous medical records reviewed     Risk of deterioration: high      Total time spent with patient: [address] discussed with: Patient, Nursing Staff and >50% of time spent in counseling and coordination of care    Discussed:  Care Plan    Prophylaxis:  Lovenox    Probable Disposition:  LTC           ___________________________________________________    Attending Physician: Jaclyn Bull MD

## 2021-03-16 NOTE — PROGRESS NOTES
Advance Care Planning (ACP) Provider Note - Comprehensive      Date of ACP Conversation:  03/15/21    Persons included in Conversation:  patient   Length of ACP Conversation in minutes:  16 minutes     Authorized Decision Maker (if patient is incapable of making informed decisions): This person is: Mrs Tyra Swanson for ALL Patients with Decision Making Capacity:  Importance of advance care planning, including choosing a healthcare agent to communicate patient's healthcare decisions if patient lost the ability to make decisions. Review of Existing Advance Directive:  Patient and family do not have an advance directive readily available for review. Active Diagnoses:   Confusion     These active diagnoses are of sufficient risk that focused discussion on advance care planning is indicated in order to allow the patient to thoughtfully consider personal goals of care; and, if situations arise that prevent the ability to personally give input, to ensure appropriate representation of their personal desires for different levels and aggressiveness of care. For Serious or Chronic Illness:  Understanding of medical condition     Reviewed pt's clinical status. [address] has multiple medical problems including advanced dementia, HTN, hypothyrodisim  and is being admitted for worsening confusion. We reviewed our treatment plan and anticipated discharge plans. Further, we discussed pt's wishes on how  she would like to proceed (aggressive/heroic resuscitation vs not intervening and allowing nature takes its course) if she were to suffer cardiopulmonary arrest.    Understanding of CPR, goals and expected outcomes, benefits and burdens discussed. Information on CPR success provided (many factors lower a patients chance of survival, including advanced age, performance status, malignancy, and presence of multiple comorbidities);  Individual asked to communicate understanding of information in his/her own words.    CPR works best to restart the heart when there is a sudden event, like a heart attack, in someone who is otherwise healthy. Unfortunately, CPR does not typically restart the heart for people who have serious health conditions or who are very sick. \"     \"In the event your heart stopped as a result of an underlying serious health condition, would you want attempts to be made to restart your heart (answer \"yes\" for attempt to resuscitate) or would you prefer a natural death (answer \"no\" for do not attempt to resuscitate)? \" Yes    Her daughter made it very clear to me that although she agrees her mother to be DNR, but has to talk to other family members and for now a full code.         Interventions Provided:  Referral made for ACP follow-up assistance to: Palliative care

## 2021-03-16 NOTE — PROGRESS NOTES
Physical Therapy  3/16/2021    Orders received and acknowledged. Chart reviewed and cleared for PT evaluation. Attempted evaluation though pt currently off the floor. Will follow up as time allows. 1450: Pt remains off the floor. Noted pt with acute R humerus fracture. Will hold evaluation until cleared by orthopedics.      Thank Baldomero Leigh, PT, DPT

## 2021-03-16 NOTE — PROGRESS NOTES
Bedside shift change report given to Dinesh Kwan RN (oncoming nurse) by Kath Alicia RN (offgoing nurse). Report included the following information SBAR, Kardex, ED Summary, MAR, Recent Results and Cardiac Rhythm NSR.

## 2021-03-16 NOTE — ED NOTES
Dr. Carline Porter at bedside. Daughter expressed need for sitter with this patient. Pt. Required a sitter on her last admission.

## 2021-03-16 NOTE — PROGRESS NOTES
Occupational Therapy:  03/16/21    Orders received and appreciated, chart reviewed, noted per chart pt with GLF 2 days ago and with R UE pain. Xray of R UE revealed R mildly displaced proximal humerus fx. Will hold OT evaluation until evaluated by Ortho.      Thank you,  Georges Lerner, OTR/L

## 2021-03-16 NOTE — PROGRESS NOTES
Patient received from ED. Care Management follow up    Patient admitted for acute encephalopathy, worsening confusion, GLF with right humerus fx. Hx dementia, HTN, breast cancer, hypothyroid. Lives in Memory Unit at [facility]. RUR score 23%/ moderate risk    Current status  Medical management including IV antibiotics, and IV fluids. Evaluation and monitoring of mental status and right humerus fx. Transition of Care Plan  1. Monitor patient status and response to treatment. 2. Medical management. 3. Patient resides at the [facility] at [address] [facility] [address] in [place]. 4. Await PT/OT evaluations. 5. Palliative consult. 6. Patient walking independently prior to admission. 7. CM to monitor progress and recommendations.     Mignon Dumont, RN, MSN/Care manager

## 2021-03-16 NOTE — PROGRESS NOTES
Freddie Chamberselsen [address] [place] [address]  [address]  [phone]      Medical Progress Note      NAME: Geronimo Grimes   :  1934  MRM:  [id number]    Date/Time of service: 3/16/2021  8:26 AM       Subjective:     Chief Complaint:  Patient was personally seen and examined by me during this time period. Chart reviewed. Troponin trending up. Unable to obtain reliable ROS due to dementia. Objective:       Vitals:       Last 24hrs VS reviewed since prior progress note.  Most recent are:    Visit Vitals  /69   Pulse 85   Temp 98.2 °F (36.8 °C)   Resp 17   Ht 5' 3\" (1.6 m)   Wt 72.6 kg (160 lb 0.8 oz)   SpO2 94%   BMI 28.35 kg/m²     SpO2 Readings from Last 6 Encounters:   21 94%   20 98%   19 95%   13 97%        No intake or output data in the 24 hours ending 21 0826     Exam:     Physical Exam:    Gen:  elderly, in no acute distress  HEENT:  Pink conjunctivae, PERRL, hearing intact to voice, dry mucous membranes  Neck:  Supple, thyroid non tender   Resp:  No accessory muscle use, clear breath sounds without wheezes rales or rhonchi  Card:  sysoltic murmurs, normal S1, S2, b/l peripheral edema  Abd:  Soft, non-tender, non-distended, normoactive bowel sounds are present  Musc:  No cyanosis or clubbing  Skin:  No rashes or ulcers, skin turgor is good  Neuro:  Cranial nerves 3-12 are grossly intact, moves all extremities, follows some commands   Psych:  poor insight      Medications Reviewed: (see below)    Lab Data Reviewed: (see below)    ______________________________________________________________________    Medications:     Current Facility-Administered Medications   Medication Dose Route Frequency    amLODIPine (NORVASC) tablet 2.5 mg  2.5 mg Oral DAILY    L.acidophilus-paracasei-S.thermophil-bifidobacter (RISAQUAD) 8 billion cell capsule  1 Cap Oral DAILY    levothyroxine (SYNTHROID) tablet 50 mcg  50 mcg Oral ACB    LORazepam (ATIVAN) tablet 0.25 mg  0.25 mg Oral Q8H PRN    metoprolol tartrate (LOPRESSOR) tablet 25 mg  25 mg Oral BID    psyllium husk-aspartame (METAMUCIL FIBER) packet 1 Packet  1 Packet Oral DAILY    risperiDONE (RisperDAL) tablet 0.25 mg  0.25 mg Oral BID    sertraline (ZOLOFT) tablet 150 mg  150 mg Oral DAILY    labetaloL (NORMODYNE;TRANDATE) 20 mg/4 mL (5 mg/mL) injection 20 mg  20 mg IntraVENous Q4H PRN    hydrALAZINE (APRESOLINE) 20 mg/mL injection 20 mg  20 mg IntraVENous Q4H PRN    melatonin (rapid dissolve) tablet 5 mg  5 mg Oral QHS PRN    LORazepam (ATIVAN) injection 0.5 mg  0.5 mg IntraVENous Q6H PRN    oxyCODONE IR (ROXICODONE) tablet 5 mg  5 mg Oral Q4H PRN    oxyCODONE IR (ROXICODONE) tablet 10 mg  10 mg Oral Q4H PRN    morphine injection 2 mg  2 mg IntraVENous Q4H PRN    diphenhydrAMINE (BENADRYL) injection 25 mg  25 mg IntraVENous Q6H PRN    diphenhydrAMINE (BENADRYL) capsule 25 mg  25 mg Oral Q6H PRN    albuterol (PROVENTIL VENTOLIN) nebulizer solution 2.5 mg  2.5 mg Nebulization Q4H PRN    simethicone (MYLICON) tablet 80 mg  80 mg Oral QID PRN    aspirin chewable tablet 81 mg  81 mg Oral DAILY    sodium chloride (NS) flush 5-40 mL  5-40 mL IntraVENous Q8H    sodium chloride (NS) flush 5-40 mL  5-40 mL IntraVENous PRN    acetaminophen (TYLENOL) tablet 650 mg  650 mg Oral Q6H PRN    Or    acetaminophen (TYLENOL) suppository 650 mg  650 mg Rectal Q6H PRN    polyethylene glycol (MIRALAX) packet 17 g  17 g Oral DAILY PRN    promethazine (PHENERGAN) tablet 12.5 mg  12.5 mg Oral Q6H PRN    Or    ondansetron (ZOFRAN) injection 4 mg  4 mg IntraVENous Q6H PRN    enoxaparin (LOVENOX) injection 40 mg  40 mg SubCUTAneous DAILY    0.9% sodium chloride infusion  75 mL/hr IntraVENous CONTINUOUS    cefTRIAXone (ROCEPHIN) 1 g in sterile water (preservative free) 10 mL IV syringe  1 g IntraVENous Q24H    haloperidol lactate (HALDOL) injection 2 mg  2 mg IntraMUSCular Q6H PRN     Current Outpatient Medications   Medication Sig    therapeutic multivitamin (THERAGRAN) tablet Take 1 Tab by mouth daily.  gentamicin (GARAMYCIN) 0.1 % topical ointment Apply  to affected area three (3) times daily. Apply to open wound on the leg    Lactobacillus acidophilus (BACID) cap Take 1 Cap by mouth daily.  psyllium (METAMUCIL) packet Take 1 Packet by mouth daily.  sertraline (ZOLOFT) 100 mg tablet Take 150 mg by mouth daily.  triamcinolone acetonide (KENALOG) 0.025 % topical cream Apply  to affected area every other day. use thin layer  Apply topically every other day for active rashes on the legs for rash and other nonspecific skin    mupirocin (BACTROBAN) 2 % ointment Apply  to affected area three (3) times daily. Apply topically for open wounds on legs    acetaminophen (TylenoL) 325 mg tablet Take 325 mg by mouth two (2) times daily as needed for Pain (leg pain).  LORazepam (ATIVAN) 0.5 mg tablet Take 0.25 mg by mouth every eight (8) hours as needed for Anxiety.  risperiDONE (RisperDAL) 0.25 mg tablet Take 0.25 mg by mouth two (2) times a day.  levothyroxine (SYNTHROID) 50 mcg tablet Take 50 mcg by mouth Daily (before breakfast).  amLODIPine (NORVASC) 2.5 mg tablet Take 2.5 mg by mouth daily.  metoprolol tartrate (LOPRESSOR) 25 mg tablet Take 25 mg by mouth two (2) times a day.  cholecalciferol (Vitamin D3) 25 mcg (1,000 unit) cap Take  by mouth daily. Lab Review:     Recent Labs     03/16/21  0142 03/15/21  1757   WBC 9.2 9.4   HGB 9.5* 10.8*   HCT 29.1* 35.0    255     Recent Labs     03/16/21  0142 03/15/21  1757    136   K 4.1 4.1    105   CO2 28 23   * 95   BUN 34* 35*   CREA 0.88 0.92   CA 8.4* 8.9   ALB  --  3.3*   TBILI  --  0.5   ALT  --  24     No results found for: GLUCPOC       Assessment / Plan:     NSTEMI: EKG with no overt ischemia. Troponin trending up. Repeat troponin. Obtain echo. Obtain lipid panel. Start aspirin.  Resume home BB. Cardiology consult. Acute encephalopathy (3/15/2021)/ Confusion (3/15/2021)/advanced dementia ([address] Possible due to dehydration/pyuria. Cardiac event? Ct  Head wo any acute changes. Tsh wnl. IVF and IV ceftriaxone.      Pyuria/trace LE(POA). Follow urine cx. IV ceftriaxone.     HTN (hypertension) (3/15/2021):  Continue home BB and amlodipine.      Acquired hypothyroidism (3/15/2021). TSH wnl.  Continue Synthroid.        Total time spent with patient: 28 Minutes **I personally saw and examined the patient during this time period**                 Care Plan discussed with: Care Manager    Discussed:  Care Plan    Prophylaxis:  Lovenox    Disposition:  SNF/LTC           ___________________________________________________    Attending Physician: Tricia Aschoff, DO

## 2021-03-16 NOTE — CONSULTS
Yvette Cortes MD. [facility] [place]              Patient: Geronimo Grimes  : 1934      Today's Date: 3/16/2021          HISTORY OF PRESENT ILLNESS:     History of Present Illness:  Reason for consult: elevated troponin (0.13)     Ms. Kourtney Henry is a 80 y.o.  female with past medical history of advanced dementia, resides in a memory center, hypertension, breast cancer, hypothyroidism Cardiology is asked to see for minimally elevated troponin level. History obtained from the chart \"she presented to ER a fall 2 days ago with right arm injury. Since then patient has been more confused, lethargic and not communicating as she used to. \"  When I see Ms. Kourtney Henry she looks well. I ask her how she is doing and she says \"I'm happy\". Denies CP or SOB to me. I spoke to daughter and she says mom did complaint of \"heart hurting\" in the past but not sure if it was depression or true pain.              PAST MEDICAL HISTORY:     Past Medical History:   Diagnosis Date    Cancer ([address]     beast CA    Endocrine disease     hypothyroid    Hypertension        Past Surgical History:   Procedure Laterality Date    BREAST SURGERY PROCEDURE UNLISTED      right mastectomy           MEDICATIONS:     Current Facility-Administered Medications   Medication Dose Route Frequency Provider Last Rate Last Admin    amLODIPine (NORVASC) tablet 2.5 mg  2.5 mg Oral DAILY Felipe De Santiago MD        L.acidophilus-paracasei-S.thermophil-bifidobacter (RISAQUAD) 8 billion cell capsule  1 Cap Oral DAILY Lynette Lares MD        levothyroxine (SYNTHROID) tablet 50 mcg  50 mcg Oral ACB Felipe De Santiago MD        LORazepam (ATIVAN) tablet 0.25 mg  0.25 mg Oral Q8H PRN Felipe De Santiago MD        metoprolol tartrate (LOPRESSOR) tablet 25 mg  25 mg Oral BID Felipe De Santiago MD        psyllium husk-aspartame (METAMUCIL FIBER) packet 1 Packet  1 Packet Oral DAILY Felipe De Santiago MD        risperiDONE (RisperDAL) tablet 0.25 mg  0.25 mg Oral BID America Rodgers MD        sertraline (ZOLOFT) tablet 150 mg  150 mg Oral DAILY Felipe De Santiago MD        labetaloL (NORMODYNE;TRANDATE) 20 mg/4 mL (5 mg/mL) injection 20 mg  20 mg IntraVENous Q4H PRN Faisal Rowley MD        hydrALAZINE (APRESOLINE) 20 mg/mL injection 20 mg  20 mg IntraVENous Q4H PRN Faisal Rowley MD        melatonin (rapid dissolve) tablet 5 mg  5 mg Oral QHS PRN Faisal Rowley MD        LORazepam (ATIVAN) injection 0.5 mg  0.5 mg IntraVENous Q6H PRN Faisal Rowley MD        oxyCODONE IR (ROXICODONE) tablet 5 mg  5 mg Oral Q4H PRN Faisal Rowley MD        oxyCODONE IR (ROXICODONE) tablet 10 mg  10 mg Oral Q4H PRN Faisal Rowley MD        morphine injection 2 mg  2 mg IntraVENous Q4H PRN Faisal Rowley MD        diphenhydrAMINE (BENADRYL) injection 25 mg  25 mg IntraVENous Q6H PRN Faisal Rowley MD   25 mg at 03/16/21 0448    diphenhydrAMINE (BENADRYL) capsule 25 mg  25 mg Oral Q6H PRN Faisal Rowley MD        albuterol (PROVENTIL VENTOLIN) nebulizer solution 2.5 mg  2.5 mg Nebulization Q4H PRN Faisal Rowley MD        [facility]) tablet 80 mg  80 mg Oral QID PRN Faisal Rowley MD        aspirin chewable tablet 81 mg  81 mg Oral DAILY Mary Schultz,         sodium chloride (NS) flush 5-40 mL  5-40 mL IntraVENous Q8H Felipe De Santiago MD   10 mL at 03/16/21 0458    sodium chloride (NS) flush 5-40 mL  5-40 mL IntraVENous PRN Felipe Jimenez MD        acetaminophen (TYLENOL) tablet 650 mg  650 mg Oral Q6H PRN Felipe De Santiago MD        Or    acetaminophen (TYLENOL) suppository 650 mg  650 mg Rectal Q6H PRN Felipe De Santiago MD        polyethylene glycol (MIRALAX) packet 17 g  17 g Oral DAILY PRN Felipe De Santiago MD        promethazine (PHENERGAN) tablet 12.5 mg  12.5 mg Oral Q6H PRN Felipe De Santiago MD        Or    ondansetron (ZOFRAN) injection 4 mg  4 mg IntraVENous Q6H PRN Felipe De Santiago MD        enoxaparin (LOVENOX) injection 40 mg  40 mg SubCUTAneous DAILY Felipe De Santiago MD   40 mg at 03/16/21 0820    0.9% sodium chloride infusion  75 mL/hr IntraVENous CONTINUOUS Felipe De Santiago MD 75 mL/hr at 03/15/21 2241 75 mL/hr at 03/15/21 2241    cefTRIAXone (ROCEPHIN) 1 g in sterile water (preservative free) 10 mL IV syringe  1 g IntraVENous Q24H Felipe De Santiago MD        haloperidol lactate (HALDOL) injection 2 mg  2 mg IntraMUSCular Q6H PRN Felipe De Santiago MD         Current Outpatient Medications   Medication Sig Dispense Refill    therapeutic multivitamin (THERAGRAN) tablet Take 1 Tab by mouth daily.  gentamicin (GARAMYCIN) 0.1 % topical ointment Apply  to affected area three (3) times daily. Apply to open wound on the leg      Lactobacillus acidophilus (BACID) cap Take 1 Cap by mouth daily.  psyllium (METAMUCIL) packet Take 1 Packet by mouth daily.  sertraline (ZOLOFT) 100 mg tablet Take 150 mg by mouth daily.  triamcinolone acetonide (KENALOG) 0.025 % topical cream Apply  to affected area every other day. use thin layer  Apply topically every other day for active rashes on the legs for rash and other nonspecific skin      mupirocin (BACTROBAN) 2 % ointment Apply  to affected area three (3) times daily. Apply topically for open wounds on legs      acetaminophen (TylenoL) 325 mg tablet Take 325 mg by mouth two (2) times daily as needed for Pain (leg pain).  LORazepam (ATIVAN) 0.5 mg tablet Take 0.25 mg by mouth every eight (8) hours as needed for Anxiety.  risperiDONE (RisperDAL) 0.25 mg tablet Take 0.25 mg by mouth two (2) times a day.  levothyroxine (SYNTHROID) 50 mcg tablet Take 50 mcg by mouth Daily (before breakfast).  amLODIPine (NORVASC) 2.5 mg tablet Take 2.5 mg by mouth daily.  metoprolol tartrate (LOPRESSOR) 25 mg tablet Take 25 mg by mouth two (2) times a day.  cholecalciferol (Vitamin D3) 25 mcg (1,000 unit) cap Take  by mouth daily.          Allergies   Allergen Reactions    Sulfa (Sulfonamide Antibiotics) Anaphylaxis    Neosporin [Hydrocortisone] Hives             SOCIAL HISTORY:     Social History     Tobacco Use    Smoking status: Former Smoker    Smokeless tobacco: Never Used   Substance Use Topics    Alcohol use: Not Currently    Drug use: Never         FAMILY HISTORY:     Unable to obtain from patient           REVIEW OF SYMPTOMS:     Review of Symptoms:  Constitutional: Negative for fever, chills  HEENT: Negative for nosebleeds, tinnitus, and vision changes. Respiratory: Negative for cough, wheezing  Cardiovascular: Negative for orthopnea, claudication, syncope, and PND. Gastrointestinal: Negative for abdominal pain, diarrhea, melena. Genitourinary: Negative for dysuria  Musculoskeletal: Negative for myalgias. Skin: Negative for rash  Heme: No problems bleeding. Neurological: Negative for speech change and focal weakness. PHYSICAL EXAM:     Physical Exam:  Visit Vitals  BP (!) 140/94   Pulse 96   Temp 98.7 °F (37.1 °C)   Resp 17   Ht 5' 3\" (1.6 m)   Wt 160 lb 0.8 oz (72.6 kg)   SpO2 96%   BMI 28.35 kg/m²     Patient appears generally well, mood and affect are appropriate and pleasant. HEENT:  Hearing intact, non-icteric, normocephalic, atraumatic. Neck Exam: Supple, No JVD    Lung Exam: Clear to auscultation, even breath sounds. Cardiac Exam: Regular rate and rhythm with 6-8/6 systolic murmur    Abdomen: Soft, non-tender, normal bowel sounds. No bruits or masses. Extremities: Moves all ext well. No lower extremity edema.   MSKTL: Overall good ROM ext  Skin: No significant rashes  Psych: Appropriate affect  Neuro - Grossly intact        LABS / OTHER STUDIES reviewed:       Recent Results (from the past 24 hour(s))   CBC WITH AUTOMATED DIFF    Collection Time: 03/15/21  5:57 PM   Result Value Ref Range    WBC 9.4 3.6 - 11.0 K/uL    RBC 3.83 3.80 - 5.20 M/uL    HGB 10.8 (L) 11.5 - 16.0 g/dL    HCT 35.0 35.0 - 47.0 %    MCV 91.4 80.0 - 99.0 FL    MCH 28.2 26.0 - 34.0 PG    MCHC 30.9 30.0 - 36.5 g/dL    RDW 13.0 11.5 - 14.5 %    PLATELET 200 862 - 610 K/uL    MPV 10.0 8.9 - 12.9 FL    NRBC 0.0 0  WBC    ABSOLUTE NRBC 0.00 0.00 - 0.01 K/uL    NEUTROPHILS 75 32 - 75 %    LYMPHOCYTES 10 (L) 12 - 49 %    MONOCYTES 13 5 - 13 %    EOSINOPHILS 1 0 - 7 %    BASOPHILS 1 0 - 1 %    IMMATURE GRANULOCYTES 0 0.0 - 0.5 %    ABS. NEUTROPHILS 7.0 1.8 - 8.0 K/UL    ABS. LYMPHOCYTES 0.9 0.8 - 3.5 K/UL    ABS. MONOCYTES 1.2 (H) 0.0 - 1.0 K/UL    ABS. EOSINOPHILS 0.1 0.0 - 0.4 K/UL    ABS. BASOPHILS 0.1 0.0 - 0.1 K/UL    ABS. IMM. GRANS. 0.0 0.00 - 0.04 K/UL    DF AUTOMATED     METABOLIC PANEL, COMPREHENSIVE    Collection Time: 03/15/21  5:57 PM   Result Value Ref Range    Sodium 136 136 - 145 mmol/L    Potassium 4.1 3.5 - 5.1 mmol/L    Chloride 105 97 - 108 mmol/L    CO2 23 21 - 32 mmol/L    Anion gap 8 5 - 15 mmol/L    Glucose 95 65 - 100 mg/dL    BUN 35 (H) 6 - 20 MG/DL    Creatinine 0.92 0.55 - 1.02 MG/DL    BUN/Creatinine ratio 38 (H) 12 - 20      GFR est AA >60 >60 ml/min/1.73m2    GFR est non-AA 58 (L) >60 ml/min/1.73m2    Calcium 8.9 8.5 - 10.1 MG/DL    Bilirubin, total 0.5 0.2 - 1.0 MG/DL    ALT (SGPT) 24 12 - 78 U/L    AST (SGOT) 22 15 - 37 U/L    Alk.  phosphatase 81 45 - 117 U/L    Protein, total 6.6 6.4 - 8.2 g/dL    Albumin 3.3 (L) 3.5 - 5.0 g/dL    Globulin 3.3 2.0 - 4.0 g/dL    A-G Ratio 1.0 (L) 1.1 - 2.2     TROPONIN I    Collection Time: 03/15/21  5:57 PM   Result Value Ref Range    Troponin-I, Qt. 0.08 (H) <0.05 ng/mL   TSH 3RD GENERATION    Collection Time: 03/15/21  5:57 PM   Result Value Ref Range    TSH 3.25 0.36 - 3.74 uIU/mL   DRUG SCREEN, URINE    Collection Time: 03/15/21  8:37 PM   Result Value Ref Range    AMPHETAMINES Negative NEG      BARBITURATES Negative NEG      BENZODIAZEPINES Negative NEG      COCAINE Negative NEG      METHADONE Negative NEG      OPIATES Negative NEG      PCP(PHENCYCLIDINE) Negative NEG      THC (TH-CANNABINOL) Negative NEG Drug screen comment (NOTE)    URINALYSIS W/MICROSCOPIC    Collection Time: 03/15/21  8:37 PM   Result Value Ref Range    Color YELLOW/STRAW      Appearance CLEAR CLEAR      Specific gravity 1.015 1.003 - 1.030      pH (UA) 6.0 5.0 - 8.0      Protein Negative NEG mg/dL    Glucose Negative NEG mg/dL    Ketone 15 (A) NEG mg/dL    Bilirubin Negative NEG      Blood TRACE (A) NEG      Urobilinogen 0.2 0.2 - 1.0 EU/dL    Nitrites Negative NEG      Leukocyte Esterase TRACE (A) NEG      WBC 10-20 0 - 4 /hpf    RBC 5-10 0 - 5 /hpf    Epithelial cells FEW FEW /lpf    Bacteria Negative NEG /hpf    Hyaline cast 0-2 0 - 5 /lpf   URINE CULTURE HOLD SAMPLE    Collection Time: 03/15/21  8:37 PM    Specimen: Serum; Urine   Result Value Ref Range    Urine culture hold        Urine on hold in Microbiology dept for 2 days. If unpreserved urine is submitted, it cannot be used for addtional testing after 24 hours, recollection will be required.    METABOLIC PANEL, BASIC    Collection Time: 03/16/21  1:42 AM   Result Value Ref Range    Sodium 139 136 - 145 mmol/L    Potassium 4.1 3.5 - 5.1 mmol/L    Chloride 107 97 - 108 mmol/L    CO2 28 21 - 32 mmol/L    Anion gap 4 (L) 5 - 15 mmol/L    Glucose 108 (H) 65 - 100 mg/dL    BUN 34 (H) 6 - 20 MG/DL    Creatinine 0.88 0.55 - 1.02 MG/DL    BUN/Creatinine ratio 39 (H) 12 - 20      GFR est AA >60 >60 ml/min/1.73m2    GFR est non-AA >60 >60 ml/min/1.73m2    Calcium 8.4 (L) 8.5 - 10.1 MG/DL   CBC W/O DIFF    Collection Time: 03/16/21  1:42 AM   Result Value Ref Range    WBC 9.2 3.6 - 11.0 K/uL    RBC 3.31 (L) 3.80 - 5.20 M/uL    HGB 9.5 (L) 11.5 - 16.0 g/dL    HCT 29.1 (L) 35.0 - 47.0 %    MCV 87.9 80.0 - 99.0 FL    MCH 28.7 26.0 - 34.0 PG    MCHC 32.6 30.0 - 36.5 g/dL    RDW 13.2 11.5 - 14.5 %    PLATELET 887 815 - 764 K/uL    MPV 9.9 8.9 - 12.9 FL    NRBC 0.0 0  WBC    ABSOLUTE NRBC 0.00 0.00 - 0.01 K/uL   TROPONIN I    Collection Time: 03/16/21  1:42 AM   Result Value Ref Range Troponin-I, Qt. 0.13 (H) <0.05 ng/mL   TROPONIN I    Collection Time: 03/16/21  8:15 AM   Result Value Ref Range    Troponin-I, Qt. 0.12 (H) <0.05 ng/mL           CARDIAC DIAGNOSTICS:     Cardiac Evaluation Includes:  I reviewed the results below. EKG 3/15/21 - NSR, normal - I independently viewed and interpreted the test image(s) myself. CXR - no acute disease           ASSESSMENT AND PLAN:     Assessment and Plan:    1) Likely Non--MI troponin elevation   - minimal and flat trop elevation (maybe from dehydration)   - normal EKG   - Denies chest pain to me  (although confusion can be a sign of ACS in elderly) (also daughter said mom said \"heart hurts\" yesterday and in past  - Will check an echo to assess LV function   - I spoke to daughter about options for workup (a lexiscan cardiolite vs no further CAD workup). The daughter said she would speak to family and get back to me. 2) Acute encephalopathy  - thought to be from dehydration on admission   - seems better when I see her       3)  HTN (hypertension) -   -  Continue home medications          Raymundo Lopez MD, [place] [address]  [address], [address]  [address]  [address], [address] [place] [address] Drive  [address]  Ph: [phone]   Ph [phone]

## 2021-03-16 NOTE — PROGRESS NOTES
Physician Progress Note      Lester Orellana  CSN #:                  [id number]  :                       1934  ADMIT DATE:       3/15/2021 5:01 PM  [address] [place] DATE:  RESPONDING  PROVIDER #:        BINTA LUA DO          QUERY TEXT:    Pt admitted with acute encephalopathy, dehydration and pyuria . If possible, please document in progress notes and discharge summary further specificity regarding the type of encephalopathy:[[  The medical record reflects the following:  Risk Factors: 79 yo F with advanced dementia was admitted with acute encephalopathy, dehydration, pyuria and elevated troponins  Clinical Indicators: ED note states \"memory enter  Staff stated that she was not walking or talking as much is normally\"  \"Daughter at bedside states that she is normally less confused and is more lethargic\"  H&P states \"Acute encephalopathy (3/15/2021)/ Confusion (3/15/2021)/advanced dementia ([facility] Possible due to dehydration. Patient looks clinically dehydrated and her BUN is elevated. Ordered IV fluid and monitor. Her confusion likely will get worse in the hospital and needs a sitter. \"  Treatment:  IV fluid, sitter, IV ceftriaxone  Options provided:  -- Metabolic encephalopathy superimposed on dementia beyond baseline due to dehydration and pyuria  -- Encephalopathy due to ###, #.  -- Other - I will add my own diagnosis  -- Disagree - Not applicable / Not valid  -- Disagree - Clinically unable to determine / Unknown  -- Refer to Clinical Documentation Reviewer    PROVIDER RESPONSE TEXT:    This patient has metabolic encephalopathy superimposed on dementia beyond baseline 2/2 dehydration and pyuria.     Query created by: Marcia Sauceda on 3/16/2021 11:08 AM      Electronically signed by:  Tae Jameson DO 3/16/2021 5:18 PM

## 2021-03-16 NOTE — CONSULTS
[address] [place] [address]: [phone] ([phone])    Patient Name: General Prescott  YOB: 1934    Date of Initial Consult: 3/16/2021  Reason for Consult: [address] discussion  Requesting Provider: Dr. Jonathan Faulkner  Primary Care Physician: Deepak, MD Kecia     SUMMARY:   General Prescott is a 80 y.o. with a past history of Dementia, HTN, breast Cancer and  hypothyroidism, who presented from [facility] with staff reporting patient had fall 2 days ago, resulting in right arm pain, They also reported patient with increased confusion, lethargy and decreased communication. In ER patient unable to provide ROS, no distress. vitals wnl, UA+ pyuria, normal  white cell count , mild anemia. BUN 35, suggesting patient dehydrated. Troponin elevated, ekg with no overt ischemia, RR.  CT head and CXR both negative for acute process. patient started on empiric ABX. Patient was admitted on 3/15/2021 with a diagnosis of Acute encephalopathy, possible UTI. Review of chart/course of hospitalization: Patient with confusion,  increasing restlessness, becoming agitated, requiring sitter. Troponin trending up, echo ordered, cardiology consulted. palliative exam found patient with right shoulder and upper arm pain and associated  decreased right arm movement , Xray +comminuted, mildly displaced right. Proximal humerus fx, ortho consulted. Current medical issues leading to Palliative Medicine involvement include: GOC discussion      Psychosocial Hx: Born [place] raised in [place]. . Has 3 children, 2 daughters that live locally and 1 son that lives in [place] she also has grandchildren. She is a College graduate, She worked x 30 years for a company in [place] area. Family report hx of depression after divorce, however she refused therapy and had refused antidepressant. Baseline cognitive and functional status: Ambulated independently, was conversant, recognizes family, but has no short term memory.  Family also report intermittent agitation. PALLIATIVE DIAGNOSES:   1. Altered mental status, unspecified  2. Dementia  3. Dehydration  4. Pruritus  5. Pain  6. Weakness, generalized  7. Advance care planning discussion  8. DNR discussion  9. Goals of care discussion       PLAN:   1. Prior to visit spoke with patient's nurse Freddie Burr RN. 2. Patient was seen,  Anup Shelley and sitter at bedside. Daughter Tl was out in ER waiting room. 3. Patient was laying in stretcher, awake, intermittently tearful, though she was easily distracted. She was able to answer yes/no questions and some simple open ended questions, otherwise she was very confused, unable to tell me why she was in hospital.  4. I met with daughter Tl, who was unable to be at bedside due to pending covid test. Introduced myself and role of palliative. Discussed patients health and psychosocial hx. Provided update. 5. Altered mental status/metabolic encephalopathy in setting of dementia. Possible etiology dehydration/pyuria or Pain? 6. Pain- Patient reporting right upper arm and shoulder pain. She is unable to tell me why it hurts. Daughter stated right arm pain s/p fall  2 days before admission. Patient right handed. She is guarding right arm, unable to lift arm, using non dominant hand/arm to drink. Discussed with attending, Dr. Yancy Galeana, she agreed to imaging of right arm, including shoulder (at time of documentation imaging found patient with comminuted, mildly displaced right. Proximal humerus fx, notified daughter). · For basal pain control, Scheduled Acetaminophen 650mg orally every 6 hours x 2 days. 7. Advance care planning discussion-no AMD in the EMR. Daughter brought in a copy of AMD, I gave to ER  ms. lyman to scan. Patient designated her daughter, Yina Parrish to be primary health care decision makers. Her daughter Nazia Dawkins and son Saul Neves would serve as secondary health decision makers. Son is financial POA.  ACP portion of EMR updated. 8. DNR discussion-patient has full CODE STATUS. Discussed with daughter, who spoke with her siblings, including primary health care decision maker, Benito Lemos, all agreed that patient should have a DNR code status. I placed order and notified patients nurse. I also gave her a copy of Durable DNR, for daughter Benito Lemos to complete  9. Called daughter Kaia Dixon this afternoon to provide update on results of right shoulder/arm xray, pain management. 10. Goals of care discussion- Family seems very realistic, they do want  restorative treatments and interventions as long as it contributes to her quality of life, that the perceived benefits outweigh the burden. · Family spoke with cardiology this morning and have s decided that they DO [address] workup. ·  At discharge, they will likely want her to return to the memory [facility]. 11. initial consult note routed to primary continuity provider and/or primary health care team members  12.  Communicated plan of care with: Palliative IDT, [facility], Laura Samuels RN, Dr. Rito López, [facility] nurse Susanna Benavides RN     GOALS OF CARE / TREATMENT PREFERENCES:     GOALS OF CARE:  Patient/Health Care Proxy Stated Goals: Prolong life    TREATMENT PREFERENCES:   Code Status: DNR    Advance Care Planning:  [x] The [facility] Interdisciplinary Team has updated the ACP Navigator with [address] and Patient Capacity      Primary Decision Maker (Active): Flakita Sigala Child - [phone]    Secondary Decision Maker: Tani Durham - Daughter - [phone]    Secondary Decision Maker: Osman Reyes - [phone]  Advance Care Planning 7/20/2020   Confirm Advance Directive Yes, on file       Medical Interventions: Limited additional interventions     Other Instructions:   Artificially Administered Nutrition: No feeding tube     Other:    As far as possible, the palliative care team has discussed with patient / health care proxy about goals of care / treatment preferences for patient. HISTORY:     History obtained from: medical records/nurse/family/patient    CHIEF COMPLAINT: N/A    HPI/SUBJECTIVE:    The patient is:   [] Verbal and participatory  [x] Non-participatory due to:   Patient is verbal, but not oriented to time, place or situation, unable to tell me why in hospital, unable to provide ros or health hx. Clinical Pain Assessment (nonverbal scale for severity on nonverbal patients):   Clinical Pain Assessment  Severity: 4  Location: right shoulder/arm  Character: unable to report  Duration: coule of days  Effect: unable to lift arm  Factors: movement, touch  Frequency: continous          Duration: for how long has pt been experiencing pain (e.g., 2 days, 1 month, years)  Frequency: how often pain is an issue (e.g., several times per day, once every few days, constant)     FUNCTIONAL ASSESSMENT:     Palliative Performance Scale (PPS):  PPS: 40       PSYCHOSOCIAL/SPIRITUAL SCREENING:     Palliative IDT has assessed this patient for cultural preferences / practices and a referral made as appropriate to needs (Cultural Services, Patient Advocacy, Ethics, etc.)    Any spiritual / Anabaptist concerns:  [] Yes /  [x] No    Caregiver Burnout:  [] Yes /  [x] No /  [] No Caregiver Present      Anticipatory grief assessment:   [x] Normal  / [] Maladaptive       ESAS Anxiety: Anxiety: 1    ESAS Depression:          REVIEW OF SYSTEMS:     Positive and pertinent negative findings in ROS are noted above in HPI. The following systems were [] reviewed / [x] unable to be reviewed as noted in HPI  Other findings are noted below. Systems: constitutional, ears/nose/mouth/throat, respiratory, gastrointestinal, genitourinary, musculoskeletal, integumentary, neurologic, psychiatric, endocrine. Positive findings noted below.   Modified ESAS Completed by: provider   Fatigue: 4 Drowsiness: 0     Pain: 4   Anxiety: 1 Nausea: 0   Anorexia: 0 Dyspnea: 0 PHYSICAL EXAM:     From RN flowsheet:  Wt Readings from Last 3 Encounters:   03/16/21 160 lb (72.6 kg)   07/17/20 160 lb 0.9 oz (72.6 kg)   06/05/19 160 lb (72.6 kg)     Blood pressure 136/78, pulse 74, temperature 99.1 °F (37.3 °C), resp. rate 18, height 5' 3\" (1.6 m), weight 160 lb (72.6 kg), SpO2 96 %. Pain Scale 1: PAINAD (Advanced Dementia)  Pain Intensity 1: 10(\"50\")  Pain Onset 1: Acute  Pain Location 1: Arm(? Pt giving unclear answers.)     Pain Description 1: Aching  Pain Intervention(s) 1: MD notified (comment)  Last bowel movement, if known:     Constitutional: appears stated age, confused, some tearfulness  Eyes: pupils equal, anicteric  ENMT: no nasal discharge, drymucous membranes  Cardiovascular: regular rhythm, distal pulses intact  Respiratory: breathing not labored, symmetric  Gastrointestinal: soft non-tender, +bowel sounds  Musculoskeletal: no deformity, +tenderness of right upper arm and shoulder, no deformity  Skin: warm, dry  Neurologic: confused, oriented to self and family, following simple commands, Decreased movement in right arm, not able to lift arm, is able to bend arm at elbow, hold cup with right hand, but bot able to lift to mouth, is moving Left UE and bilateral LE  Without difficulty  Psychiatric: appropriate affect, no hallucinations  Other:       HISTORY:     Active Problems:    Acute encephalopathy (3/15/2021)      HTN (hypertension) (3/15/2021)      Dementia ([address] (3/15/2021)      Acquired hypothyroidism (3/15/2021)      Elevated troponin (3/15/2021)      Confusion (3/15/2021)      Past Medical History:   Diagnosis Date    Cancer ([address]     beast CA    Endocrine disease     hypothyroid    Hypertension       Past Surgical History:   Procedure Laterality Date    BREAST SURGERY PROCEDURE UNLISTED      right mastectomy      No family history on file. History reviewed, no pertinent family history.   Social History     Tobacco Use    Smoking status: Former Smoker    Smokeless tobacco: Never Used   Substance Use Topics    Alcohol use: Not Currently     Allergies   Allergen Reactions    Sulfa (Sulfonamide Antibiotics) Anaphylaxis    Neosporin [Hydrocortisone] Hives      Current Facility-Administered Medications   Medication Dose Route Frequency    amLODIPine (NORVASC) tablet 2.5 mg  2.5 mg Oral DAILY    L.acidophilus-paracasei-S.thermophil-bifidobacter (RISAQUAD) 8 billion cell capsule  1 Cap Oral DAILY    levothyroxine (SYNTHROID) tablet 50 mcg  50 mcg Oral ACB    LORazepam (ATIVAN) tablet 0.25 mg  0.25 mg Oral Q8H PRN    metoprolol tartrate (LOPRESSOR) tablet 25 mg  25 mg Oral BID    psyllium husk-aspartame (METAMUCIL FIBER) packet 1 Packet  1 Packet Oral DAILY    risperiDONE (RisperDAL) tablet 0.25 mg  0.25 mg Oral BID    sertraline (ZOLOFT) tablet 150 mg  150 mg Oral DAILY    labetaloL (NORMODYNE;TRANDATE) 20 mg/4 mL (5 mg/mL) injection 20 mg  20 mg IntraVENous Q4H PRN    hydrALAZINE (APRESOLINE) 20 mg/mL injection 20 mg  20 mg IntraVENous Q4H PRN    melatonin (rapid dissolve) tablet 5 mg  5 mg Oral QHS PRN    LORazepam (ATIVAN) injection 0.5 mg  0.5 mg IntraVENous Q6H PRN    oxyCODONE IR (ROXICODONE) tablet 5 mg  5 mg Oral Q4H PRN    diphenhydrAMINE (BENADRYL) injection 25 mg  25 mg IntraVENous Q6H PRN    diphenhydrAMINE (BENADRYL) capsule 25 mg  25 mg Oral Q6H PRN    albuterol (PROVENTIL VENTOLIN) nebulizer solution 2.5 mg  2.5 mg Nebulization Q4H PRN    simethicone (MYLICON) tablet 80 mg  80 mg Oral QID PRN    aspirin chewable tablet 81 mg  81 mg Oral DAILY    morphine injection 1 mg  1 mg IntraVENous Q4H PRN    acetaminophen (TYLENOL) tablet 650 mg  650 mg Oral Q6H    sodium chloride (NS) flush 5-40 mL  5-40 mL IntraVENous Q8H    sodium chloride (NS) flush 5-40 mL  5-40 mL IntraVENous PRN    acetaminophen (TYLENOL) tablet 650 mg  650 mg Oral Q6H PRN    Or    acetaminophen (TYLENOL) suppository 650 mg  650 mg Rectal Q6H PRN    polyethylene glycol (MIRALAX) packet 17 g  17 g Oral DAILY PRN    promethazine (PHENERGAN) tablet 12.5 mg  12.5 mg Oral Q6H PRN    Or    ondansetron (ZOFRAN) injection 4 mg  4 mg IntraVENous Q6H PRN    enoxaparin (LOVENOX) injection 40 mg  40 mg SubCUTAneous DAILY    0.9% sodium chloride infusion  75 mL/hr IntraVENous CONTINUOUS    cefTRIAXone (ROCEPHIN) 1 g in sterile water (preservative free) 10 mL IV syringe  1 g IntraVENous Q24H    haloperidol lactate (HALDOL) injection 2 mg  2 mg IntraMUSCular Q6H PRN          LAB AND IMAGING FINDINGS:     Lab Results   Component Value Date/Time    WBC 9.2 03/16/2021 01:42 AM    HGB 9.5 (L) 03/16/2021 01:42 AM    PLATELET 471 09/21/2974 01:42 AM     Lab Results   Component Value Date/Time    Sodium 139 03/16/2021 01:42 AM    Potassium 4.1 03/16/2021 01:42 AM    Chloride 107 03/16/2021 01:42 AM    CO2 28 03/16/2021 01:42 AM    BUN 34 (H) 03/16/2021 01:42 AM    Creatinine 0.88 03/16/2021 01:42 AM    Calcium 8.4 (L) 03/16/2021 01:42 AM      Lab Results   Component Value Date/Time    Alk. phosphatase 81 03/15/2021 05:57 PM    Protein, total 6.6 03/15/2021 05:57 PM    Albumin 3.3 (L) 03/15/2021 05:57 PM    Globulin 3.3 03/15/2021 05:57 PM     No results found for: INR, PTMR, PTP, PT1, PT2, APTT, INREXT, INREXT   No results found for: IRON, FE, TIBC, IBCT, PSAT, FERR   No results found for: PH, PCO2, PO2  No components found for: GLPOC   No results found for: CPK, CKMB             Total time: 100 min  Counseling / coordination time, spent as noted above: 80    > 50% counseling / coordination?: yes    Prolonged service was provided for  [x]30 min   []75 min in face to face time in the presence of the patient, spent as noted above. Time Start:   Time End:   Note: this can only be billed with 12234 (initial) or 58745 (follow up). If multiple start / stop times, list each separately.

## 2021-03-16 NOTE — ED NOTES
Bedside and Verbal shift change report given to [facility] (oncoming nurse) by Nighat Stern RN (offgoing nurse). Report included the following information SBAR, ED Summary and MAR.

## 2021-03-16 NOTE — PROGRESS NOTES
3/16/2021  8:13 AM  Case management note    Reason for Admission:   Acute encephalopathy    Patient lives in memory unit at [address]  She had been walking independently. Patient had worsening confustion and lethargic after a GLF 2 days ago. Patient has history of HTN, and breast cancer  Son is Vanesa Briggs and lives in [place]  Daughter Shruti Munoz is who facility speaks with [phone]                       RUR Score:     21%             PCP: First and Last name:   Other, Kecia, MD     Name of Practice: Facility MD   Are you a current patient: Yes/No: YES   Approximate date of last visit: Monthly   Can you participate in a virtual visit if needed:     Do you (patient/family) have any concerns for transition/discharge? Plan for utilizing home health:   PT/OT to eval    Current Advanced Directive/Advance Care Plan:  Full Code      Healthcare Decision Maker:   Alexia Evangelista ,  POA                Transition of Care Plan:          1. Return to Memory unit  2. Palliative consult  3. PT/OT  4. AD Planning  5. CM to follow for discharge needs    Care Management Interventions  PCP Verified by CM: Yes(seen by facility MD)  Mode of Transport at Discharge:  Other (see comment)  Current Support Network: Nursing Facility  Confirm Follow Up Transport: Wheelchair Arthur Doss  The Plan for Transition of Care is Related to the Following Treatment Goals : acute encephalopathy  Discharge Location  Discharge Placement: Skilled nursing facility  [address] [place] [address]

## 2021-03-16 NOTE — PROGRESS NOTES
Admission Medication Reconciliation:    Comments/Recommendations:  -Medication history obtained via MAR provided from [address] [facility] list with daughter in room for accuracy    Medications added: MVI, gentamicin ointment, probiotic, metamucil, sertraline, PRN lorazepam, PRN acetaminophen, mupirocin, triamcinolone  Medications removed: Doxycycline, escitalopram  Medications changed: Amlodipine dose, levothyroxine dose, risperidone directions    Information obtained from: [facility] from facility    Significant PMH/Disease States:   Past Medical History:   Diagnosis Date    Cancer ([address]     beast CA    Endocrine disease     hypothyroid    Hypertension        Chief Complaint for this Admission:    Chief Complaint   Patient presents with    Fatigue       Allergies:  Sulfa (sulfonamide antibiotics) and Neosporin [hydrocortisone]    Prior to Admission Medications:   Prior to Admission Medications   Prescriptions Last Dose Informant Patient Reported? Taking? LORazepam (ATIVAN) 0.5 mg tablet 3/8/2021 at Unknown time  Yes Yes   Sig: Take 0.25 mg by mouth every eight (8) hours as needed for Anxiety. Lactobacillus acidophilus (BACID) cap 3/15/2021 at AM  Yes Yes   Sig: Take 1 Cap by mouth daily. acetaminophen (TylenoL) 325 mg tablet 3/8/2021 at Unknown time  Yes Yes   Sig: Take 325 mg by mouth two (2) times daily as needed for Pain (leg pain). amLODIPine (NORVASC) 2.5 mg tablet 3/15/2021 at AM  Yes Yes   Sig: Take 2.5 mg by mouth daily. cholecalciferol (Vitamin D3) 25 mcg (1,000 unit) cap   Yes No   Sig: Take  by mouth daily. gentamicin (GARAMYCIN) 0.1 % topical ointment 3/15/2021 at AM  Yes Yes   Sig: Apply  to affected area three (3) times daily. Apply to open wound on the leg   levothyroxine (SYNTHROID) 50 mcg tablet 3/15/2021 at AM  Yes Yes   Sig: Take 50 mcg by mouth Daily (before breakfast).    metoprolol tartrate (LOPRESSOR) 25 mg tablet   Yes No   Sig: Take 25 mg by mouth two (2) times a day. mupirocin (BACTROBAN) 2 % ointment 3/15/2021 at AM  Yes Yes   Sig: Apply  to affected area three (3) times daily. Apply topically for open wounds on legs   psyllium (METAMUCIL) packet 3/15/2021 at AM  Yes Yes   Sig: Take 1 Packet by mouth daily. risperiDONE (RisperDAL) 0.25 mg tablet 3/15/2021 at AM  Yes Yes   Sig: Take 0.25 mg by mouth two (2) times a day. sertraline (ZOLOFT) 100 mg tablet 3/15/2021 at AM  Yes Yes   Sig: Take 150 mg by mouth daily. therapeutic multivitamin (THERAGRAN) tablet 3/15/2021 at AM  Yes Yes   Sig: Take 1 Tab by mouth daily. triamcinolone acetonide (KENALOG) 0.025 % topical cream 3/15/2021 at AM  Yes Yes   Sig: Apply  to affected area every other day.  use thin layer  Apply topically every other day for active rashes on the legs for rash and other nonspecific skin      Facility-Administered Medications: None       Thank you,  Nataly Yap, PHARMD

## 2021-03-16 NOTE — ED NOTES
AM labs drawn. Pt. Became restless and wanted to get out of bed during lab draw. Pt. Quickly fell back asleep. VSS with continue to monitor.

## 2021-03-16 NOTE — PROGRESS NOTES
Spiritual Care Assessment/Progress Note  [facility]      NAME: Huang Luna      MRN: [id number]  AGE: 80 y.o. SEX: female  Rastafarian Affiliation: Latter day   Language: English     3/16/2021     Total Time (in minutes): 24     Spiritual Assessment begun in [facility] EMERGENCY DEPT through conversation with:         [x]Patient        [] Family    [] Friend(s)        Reason for Consult: Palliative Care, Initial/Spiritual Assessment     Spiritual beliefs: (Please include comment if needed)     [x] Identifies with a jazlyn tradition: Baptist        [] Supported by a jazlyn community:            [] Claims no spiritual orientation:           [] Seeking spiritual identity:                [] Adheres to an individual form of spirituality:           [] Not able to assess:                           Identified resources for coping:      [] Prayer                               [] Music                  [] Guided Imagery     [x] Family/friends                 [] Pet visits     [] Devotional reading                         [] Unknown     [] Other:                                              Interventions offered during this visit: (See comments for more details)    Patient Interventions: Catharsis/review of pertinent events in supportive environment, Affirmation of jazlyn, Prayer (assurance of)           Plan of Care:     [] Support spiritual and/or cultural needs    [] Support AMD and/or advance care planning process      [] Support grieving process   [] Coordinate Rites and/or Rituals    [] Coordination with community clergy   [] No spiritual needs identified at this time   [] Detailed Plan of Care below (See Comments)  [] Make referral to Music Therapy  [] Make referral to Pet Therapy     [] Make referral to Addiction services  [] Make referral to [facility]  [] Make referral to Spiritual Care Partner  [] No future visits requested        [x] Follow up upon further referrals     Comments:  visited Mrs. Arnold for a palliative care initial spiritual assessment in the ER. Mrs. Shukri Vance was awake, pleasantly confused, and sitting up in bed. She made good eye contact and greeted the  warmly, shared that she needed to see a . Mrs. Lonnie Luis shared her thoughts, feelings, and concerns, and often wondered out loud why she was in the hospital. She also became very tearful throughout the conversation and shared that she was scared, was missing her children, and wanted to go home.  provided comforting presence and reassuring words. As  was speaking with Mrs. Tray Diamond, [address] Sammi Wynne NP, entered into the room.  remained at the beside through their conversation. Mrs. Lonnie Luis shared that she is [address] and would like to be kept in prayer. Assurance provided. 's are available for further support upon referral  Tiffanie Mckeon. Kalpana Mercer.      Paging Service: [phone])

## 2021-03-16 NOTE — PROGRESS NOTES
1700- Patient refusing to wear tele. Continues to be confused and verbally abusive to staff. PRN haldol given. Sitter was at bedside. Attempts to leave bed. 1800-PRN ativan given. Patient sitting on side of bed with nurse present. The activity belt was offered as a distraction tool but patient did not show interest. Patient scratching lower bilateral legs. Small areas were reopened. Minimal bleeding noted. Home compression socks applied to prevent further abrasions to skin from scratching. Nurse continues to monitor at bedside.

## 2021-03-16 NOTE — ED NOTES
Pt. Stated wanted to use the bathroom. Pt. Assisted to sit on side of bed, pt. Refusing to get up to use the [facility] and does not want to lay back in the bed. Pt. Becoming more agitated. Pt. Constantly itching all over her body. Pt. Offered PRN medication for itching, stated did not want it. Pt. Then states that she will take the medication for itching. Pt. Assisted to lay back in the bed about 30 minutes later.

## 2021-03-17 ENCOUNTER — APPOINTMENT (OUTPATIENT)
Dept: NON INVASIVE DIAGNOSTICS | Age: 86
DRG: 640 | End: 2021-03-17
Attending: SPECIALIST
Payer: MEDICARE

## 2021-03-17 ENCOUNTER — APPOINTMENT (OUTPATIENT)
Dept: NUCLEAR MEDICINE | Age: 86
DRG: 640 | End: 2021-03-17
Attending: NURSE PRACTITIONER
Payer: MEDICARE

## 2021-03-17 ENCOUNTER — APPOINTMENT (OUTPATIENT)
Dept: NON INVASIVE DIAGNOSTICS | Age: 86
DRG: 640 | End: 2021-03-17
Attending: NURSE PRACTITIONER
Payer: MEDICARE

## 2021-03-17 ENCOUNTER — APPOINTMENT (OUTPATIENT)
Dept: NUCLEAR MEDICINE | Age: 86
DRG: 640 | End: 2021-03-17
Attending: SPECIALIST
Payer: MEDICARE

## 2021-03-17 ENCOUNTER — HOSPITAL ENCOUNTER (OUTPATIENT)
Dept: NUCLEAR MEDICINE | Age: 86
Discharge: HOME | DRG: 640 | End: 2021-03-17
Attending: NURSE PRACTITIONER
Payer: MEDICARE

## 2021-03-17 LAB
ANION GAP SERPL CALC-SCNC: 5 MMOL/L (ref 5–15)
ATRIAL RATE: 86 BPM
BACTERIA SPEC CULT: NORMAL
BASOPHILS # BLD: 0.1 K/UL (ref 0–0.1)
BASOPHILS NFR BLD: 1 % (ref 0–1)
BUN SERPL-MCNC: 24 MG/DL (ref 6–20)
BUN/CREAT SERPL: 28 (ref 12–20)
CALCIUM SERPL-MCNC: 7.8 MG/DL (ref 8.5–10.1)
CALCULATED R AXIS, ECG10: 41 DEGREES
CALCULATED T AXIS, ECG11: 42 DEGREES
CHLORIDE SERPL-SCNC: 109 MMOL/L (ref 97–108)
CO2 SERPL-SCNC: 27 MMOL/L (ref 21–32)
CREAT SERPL-MCNC: 0.87 MG/DL (ref 0.55–1.02)
DIAGNOSIS, 93000: NORMAL
DIFFERENTIAL METHOD BLD: ABNORMAL
EOSINOPHIL # BLD: 0.2 K/UL (ref 0–0.4)
EOSINOPHIL NFR BLD: 4 % (ref 0–7)
ERYTHROCYTE [DISTWIDTH] IN BLOOD BY AUTOMATED COUNT: 13.3 % (ref 11.5–14.5)
GLUCOSE SERPL-MCNC: 88 MG/DL (ref 65–100)
HCT VFR BLD AUTO: 28 % (ref 35–47)
HGB BLD-MCNC: 9 G/DL (ref 11.5–16)
IMM GRANULOCYTES # BLD AUTO: 0 K/UL (ref 0–0.04)
IMM GRANULOCYTES NFR BLD AUTO: 0 % (ref 0–0.5)
LYMPHOCYTES # BLD: 0.9 K/UL (ref 0.8–3.5)
LYMPHOCYTES NFR BLD: 16 % (ref 12–49)
MCH RBC QN AUTO: 28.7 PG (ref 26–34)
MCHC RBC AUTO-ENTMCNC: 32.1 G/DL (ref 30–36.5)
MCV RBC AUTO: 89.2 FL (ref 80–99)
MONOCYTES # BLD: 0.7 K/UL (ref 0–1)
MONOCYTES NFR BLD: 13 % (ref 5–13)
NEUTS SEG # BLD: 3.8 K/UL (ref 1.8–8)
NEUTS SEG NFR BLD: 66 % (ref 32–75)
NRBC # BLD: 0 K/UL (ref 0–0.01)
NRBC BLD-RTO: 0 PER 100 WBC
P-R INTERVAL, ECG05: 118 MS
PLATELET # BLD AUTO: 240 K/UL (ref 150–400)
PMV BLD AUTO: 9.8 FL (ref 8.9–12.9)
POTASSIUM SERPL-SCNC: 3.6 MMOL/L (ref 3.5–5.1)
Q-T INTERVAL, ECG07: 370 MS
QRS DURATION, ECG06: 90 MS
QTC CALCULATION (BEZET), ECG08: 442 MS
RBC # BLD AUTO: 3.14 M/UL (ref 3.8–5.2)
SERVICE CMNT-IMP: NORMAL
SODIUM SERPL-SCNC: 141 MMOL/L (ref 136–145)
STRESS BASELINE DIAS BP: 64 MMHG
STRESS BASELINE HR: 71 BPM
STRESS BASELINE SYS BP: 121 MMHG
STRESS ESTIMATED WORKLOAD: 1 METS
STRESS EXERCISE DUR MIN: NORMAL
STRESS PEAK DIAS BP: 64 MMHG
STRESS PEAK SYS BP: 121 MMHG
STRESS PERCENT HR ACHIEVED: 67 %
STRESS POST PEAK HR: 90 BPM
STRESS RATE PRESSURE PRODUCT: NORMAL BPM*MMHG
STRESS ST DEPRESSION: 0 MM
STRESS ST ELEVATION: 0 MM
STRESS TARGET HR: 134 BPM
VENTRICULAR RATE, ECG03: 86 BPM
WBC # BLD AUTO: 5.6 K/UL (ref 3.6–11)

## 2021-03-17 PROCEDURE — 74011250637 HC RX REV CODE- 250/637: Performed by: NURSE PRACTITIONER

## 2021-03-17 PROCEDURE — 74011250636 HC RX REV CODE- 250/636: Performed by: INTERNAL MEDICINE

## 2021-03-17 PROCEDURE — 74011250637 HC RX REV CODE- 250/637: Performed by: INTERNAL MEDICINE

## 2021-03-17 PROCEDURE — 80048 BASIC METABOLIC PNL TOTAL CA: CPT

## 2021-03-17 PROCEDURE — A9500 TC99M SESTAMIBI: HCPCS

## 2021-03-17 PROCEDURE — 78452 HT MUSCLE IMAGE SPECT MULT: CPT | Performed by: SPECIALIST

## 2021-03-17 PROCEDURE — 99232 SBSQ HOSP IP/OBS MODERATE 35: CPT | Performed by: SPECIALIST

## 2021-03-17 PROCEDURE — 36415 COLL VENOUS BLD VENIPUNCTURE: CPT

## 2021-03-17 PROCEDURE — 65660000000 HC RM CCU STEPDOWN

## 2021-03-17 PROCEDURE — 74011250636 HC RX REV CODE- 250/636: Performed by: SPECIALIST

## 2021-03-17 PROCEDURE — 99233 SBSQ HOSP IP/OBS HIGH 50: CPT | Performed by: NURSE PRACTITIONER

## 2021-03-17 PROCEDURE — 74011000250 HC RX REV CODE- 250: Performed by: INTERNAL MEDICINE

## 2021-03-17 PROCEDURE — 85025 COMPLETE CBC W/AUTO DIFF WBC: CPT

## 2021-03-17 RX ORDER — HALOPERIDOL 5 MG/ML
2 INJECTION INTRAMUSCULAR
Status: DISCONTINUED | OUTPATIENT
Start: 2021-03-17 | End: 2021-03-18 | Stop reason: HOSPADM

## 2021-03-17 RX ORDER — ATORVASTATIN CALCIUM 20 MG/1
20 TABLET, FILM COATED ORAL
Status: DISCONTINUED | OUTPATIENT
Start: 2021-03-17 | End: 2021-03-18 | Stop reason: HOSPADM

## 2021-03-17 RX ADMIN — RISPERIDONE 0.25 MG: 0.25 TABLET ORAL at 18:18

## 2021-03-17 RX ADMIN — Medication 10 ML: at 21:16

## 2021-03-17 RX ADMIN — SODIUM CHLORIDE 75 ML/HR: 9 INJECTION, SOLUTION INTRAVENOUS at 14:35

## 2021-03-17 RX ADMIN — ACETAMINOPHEN 650 MG: 325 TABLET ORAL at 18:18

## 2021-03-17 RX ADMIN — WHITE PETROLATUM 41 % TOPICAL OINTMENT: OINTMENT at 18:27

## 2021-03-17 RX ADMIN — METOPROLOL TARTRATE 25 MG: 25 TABLET, FILM COATED ORAL at 18:18

## 2021-03-17 RX ADMIN — CEFTRIAXONE 1 G: 1 INJECTION, POWDER, FOR SOLUTION INTRAMUSCULAR; INTRAVENOUS at 20:26

## 2021-03-17 RX ADMIN — WHITE PETROLATUM 41 % TOPICAL OINTMENT: OINTMENT at 14:32

## 2021-03-17 RX ADMIN — REGADENOSON 0.4 MG: 0.08 INJECTION, SOLUTION INTRAVENOUS at 11:07

## 2021-03-17 RX ADMIN — Medication 10 ML: at 06:04

## 2021-03-17 NOTE — PROGRESS NOTES
[facility]  [address] [facility], [address] [place] [address]  [phone]      Medical Progress Note      NAME: Sisi Song   :  1934  MRM:  [id number]    Date/Time of service: 3/17/2021  4:42 PM       Subjective:     Chief Complaint:  Patient was personally seen and examined by me during this time period. Chart reviewed. Svt overnight. Patient lethargic but arousable. Unable to obtain reliable ROS due to dementia. Objective:       Vitals:       Last 24hrs VS reviewed since prior progress note.  Most recent are:    Visit Vitals  /88 (BP 1 Location: Left upper arm, BP Patient Position: At rest)   Pulse 80   Temp 99 °F (37.2 °C)   Resp 18   Ht 5' 6\" (1.676 m)   Wt 72.6 kg (160 lb)   SpO2 90%   BMI 25.82 kg/m²     SpO2 Readings from Last 6 Encounters:   21 90%   20 98%   19 95%   13 97%            Intake/Output Summary (Last 24 hours) at 3/17/2021 1642  Last data filed at 3/17/2021 1552  Gross per 24 hour   Intake 480 ml   Output 600 ml   Net -120 ml        Exam:     Physical Exam:    Gen:  elderly, in no acute distress  HEENT:  Pink conjunctivae, PERRL, hearing intact to voice, dry mucous membranes  Neck:  Supple, thyroid non tender   Resp:  No accessory muscle use, clear breath sounds without wheezes rales or rhonchi  Card:  sysoltic murmurs, normal S1, S2, b/l peripheral edema  Abd:  Soft, non-tender, non-distended, normoactive bowel sounds are present  Musc:  No cyanosis or clubbing  Skin:  No rashes or ulcers, skin turgor is good  Neuro:  Cranial nerves 3-12 are grossly intact, limited ROM of RUE   Psych:  poor insight      Medications Reviewed: (see below)    Lab Data Reviewed: (see below)    ______________________________________________________________________    Medications:     Current Facility-Administered Medications   Medication Dose Route Frequency    atorvastatin (LIPITOR) tablet 20 mg  20 mg Oral QHS    pantothenic ac-min oil-pet,hyd (AQUAPHOR) 41 % ointment   Topical BID    regadenoson (LEXISCAN) injection 0.4 mg  0.4 mg IntraVENous RAD ONCE    amLODIPine (NORVASC) tablet 2.5 mg  2.5 mg Oral DAILY    L.acidophilus-paracasei-S.thermophil-bifidobacter (RISAQUAD) 8 billion cell capsule  1 Cap Oral DAILY    levothyroxine (SYNTHROID) tablet 50 mcg  50 mcg Oral ACB    LORazepam (ATIVAN) tablet 0.25 mg  0.25 mg Oral Q8H PRN    metoprolol tartrate (LOPRESSOR) tablet 25 mg  25 mg Oral BID    psyllium husk-aspartame (METAMUCIL FIBER) packet 1 Packet  1 Packet Oral DAILY    risperiDONE (RisperDAL) tablet 0.25 mg  0.25 mg Oral BID    sertraline (ZOLOFT) tablet 150 mg  150 mg Oral DAILY    labetaloL (NORMODYNE;TRANDATE) 20 mg/4 mL (5 mg/mL) injection 20 mg  20 mg IntraVENous Q4H PRN    hydrALAZINE (APRESOLINE) 20 mg/mL injection 20 mg  20 mg IntraVENous Q4H PRN    melatonin (rapid dissolve) tablet 5 mg  5 mg Oral QHS PRN    LORazepam (ATIVAN) injection 0.5 mg  0.5 mg IntraVENous Q6H PRN    oxyCODONE IR (ROXICODONE) tablet 5 mg  5 mg Oral Q4H PRN    diphenhydrAMINE (BENADRYL) injection 25 mg  25 mg IntraVENous Q6H PRN    diphenhydrAMINE (BENADRYL) capsule 25 mg  25 mg Oral Q6H PRN    albuterol (PROVENTIL VENTOLIN) nebulizer solution 2.5 mg  2.5 mg Nebulization Q4H PRN    simethicone (MYLICON) tablet 80 mg  80 mg Oral QID PRN    aspirin chewable tablet 81 mg  81 mg Oral DAILY    morphine injection 1 mg  1 mg IntraVENous Q4H PRN    acetaminophen (TYLENOL) tablet 650 mg  650 mg Oral Q6H    sodium chloride (NS) flush 5-40 mL  5-40 mL IntraVENous Q8H    sodium chloride (NS) flush 5-40 mL  5-40 mL IntraVENous PRN    acetaminophen (TYLENOL) tablet 650 mg  650 mg Oral Q6H PRN    Or    acetaminophen (TYLENOL) suppository 650 mg  650 mg Rectal Q6H PRN    polyethylene glycol (MIRALAX) packet 17 g  17 g Oral DAILY PRN    promethazine (PHENERGAN) tablet 12.5 mg  12.5 mg Oral Q6H PRN    Or    ondansetron (ZOFRAN) injection 4 mg  4 mg IntraVENous Q6H PRN    enoxaparin (LOVENOX) injection 40 mg  40 mg SubCUTAneous DAILY    0.9% sodium chloride infusion  75 mL/hr IntraVENous CONTINUOUS    cefTRIAXone (ROCEPHIN) 1 g in sterile water (preservative free) 10 mL IV syringe  1 g IntraVENous Q24H    haloperidol lactate (HALDOL) injection 2 mg  2 mg IntraMUSCular Q6H PRN          Lab Review:     Recent Labs     03/17/21  0843 03/16/21  0142 03/15/21  1757   WBC 5.6 9.2 9.4   HGB 9.0* 9.5* 10.8*   HCT 28.0* 29.1* 35.0    245 255     Recent Labs     03/17/21  0843 03/16/21  0142 03/15/21  1757    139 136   K 3.6 4.1 4.1   * 107 105   CO2 27 28 23   GLU 88 108* 95   BUN 24* 34* 35*   CREA 0.87 0.88 0.92   CA 7.8* 8.4* 8.9   ALB  --   --  3.3*   TBILI  --   --  0.5   ALT  --   --  24     No results found for: GLUCPOC       Assessment / Plan:     NSTEMI: EKG with no overt ischemia. Elevated troponins; echo with no wall abnormalities. S/p stress test today. Cardio aware of SVT. Continue aspirin, statin and BB. Cardiology following. Right humerus fracture: post fall. Consult orthopedics. Acute encephalopathy (3/15/2021)/ Confusion (3/15/2021)/advanced dementia ([address] Possible due to dehydration/pyuria. Cardiac event? Ct  Head wo any acute changes. Tsh wnl. Treat UTI. Speech to evaluate due to concern for dysphagia.      Pyuria/trace LE(POA). Urine cx with no growth. Stop IV ceftriaxone tomorrow.      HTN (hypertension) (3/15/2021):  Continue home BB and amlodipine.      Acquired hypothyroidism (3/15/2021). TSH wnl.  Continue Synthroid.        Total time spent with patient: 28 Minutes **I personally saw and examined the patient during this time period**                 Care Plan discussed with: Care Manager    Discussed:  Care Plan    Prophylaxis:  Lovenox    Disposition:  SNF/LTC           ___________________________________________________    Attending Physician: Lucy Castanon DO

## 2021-03-17 NOTE — PROGRESS NOTES
Cardiology Progress Note                              [address] [facility], [address]                                 Phone [phone]; Fax [phone]        3/17/2021 8:15 AM     Admit Date:           3/15/2021  Admit Diagnosis:  Acute encephalopathy [G93.40]  Elevated troponin [R77.8]  :          1934   MRN:          [id number]       Impression Plan/Recommendation   1. Elevated troponin  2. Acute encephalopathy  3. Moderate dementia   4. Hypertension   5. Anemia   6. Right humerus frx  7. SVT               · BP better - continue low dose bb and norvasc   · Troponin flat, minimally elevated   · Echo showing pef w MAC and a mobile mass present of MV appears to be calcification   · Dr Ilir Pak spoke w her daughter and she  wants to proceed w stress test.   · Asa. Start statin  · 2 seconds of SVT r ate 160, continue BB. Continue to monitor         No CP. Hrt RRR, 2/6 systolic murmur. Lungs clear. Normal Lexiscan cardiolite,  Normal MPI. LVEF 51% ----> No ischemia. Echo with mild-to-moderate AS ---> can follow as outpatient   No further cardiac intervention needed. Her troponin was mildly elevated from non-MI etiology. OK for DC tomorrow from a cardiac standpoint. No intake/output data recorded. Last 3 Recorded Weights in this Encounter    03/15/21 1707 21 1126   Weight: 160 lb 0.8 oz (72.6 kg) 160 lb (72.6 kg)         No intake/output data recorded.     SUBJECTIVE               Tracie B Álvaro Latham denies chest pain or SOB        Current Facility-Administered Medications   Medication Dose Route Frequency    amLODIPine (NORVASC) tablet 2.5 mg  2.5 mg Oral DAILY    L.acidophilus-paracasei-S.thermophil-bifidobacter (RISAQUAD) 8 billion cell capsule  1 Cap Oral DAILY    levothyroxine (SYNTHROID) tablet 50 mcg  50 mcg Oral ACB    LORazepam (ATIVAN) tablet 0.25 mg  0.25 mg Oral Q8H PRN    metoprolol tartrate (LOPRESSOR) tablet 25 mg  25 mg Oral BID    psyllium husk-aspartame (METAMUCIL FIBER) packet 1 Packet  1 Packet Oral DAILY    risperiDONE (RisperDAL) tablet 0.25 mg  0.25 mg Oral BID    sertraline (ZOLOFT) tablet 150 mg  150 mg Oral DAILY    labetaloL (NORMODYNE;TRANDATE) 20 mg/4 mL (5 mg/mL) injection 20 mg  20 mg IntraVENous Q4H PRN    hydrALAZINE (APRESOLINE) 20 mg/mL injection 20 mg  20 mg IntraVENous Q4H PRN    melatonin (rapid dissolve) tablet 5 mg  5 mg Oral QHS PRN    LORazepam (ATIVAN) injection 0.5 mg  0.5 mg IntraVENous Q6H PRN    oxyCODONE IR (ROXICODONE) tablet 5 mg  5 mg Oral Q4H PRN    diphenhydrAMINE (BENADRYL) injection 25 mg  25 mg IntraVENous Q6H PRN    diphenhydrAMINE (BENADRYL) capsule 25 mg  25 mg Oral Q6H PRN    albuterol (PROVENTIL VENTOLIN) nebulizer solution 2.5 mg  2.5 mg Nebulization Q4H PRN    simethicone (MYLICON) tablet 80 mg  80 mg Oral QID PRN    aspirin chewable tablet 81 mg  81 mg Oral DAILY    morphine injection 1 mg  1 mg IntraVENous Q4H PRN    acetaminophen (TYLENOL) tablet 650 mg  650 mg Oral Q6H    sodium chloride (NS) flush 5-40 mL  5-40 mL IntraVENous Q8H    sodium chloride (NS) flush 5-40 mL  5-40 mL IntraVENous PRN    acetaminophen (TYLENOL) tablet 650 mg  650 mg Oral Q6H PRN    Or    acetaminophen (TYLENOL) suppository 650 mg  650 mg Rectal Q6H PRN    polyethylene glycol (MIRALAX) packet 17 g  17 g Oral DAILY PRN    promethazine (PHENERGAN) tablet 12.5 mg  12.5 mg Oral Q6H PRN    Or    ondansetron (ZOFRAN) injection 4 mg  4 mg IntraVENous Q6H PRN    enoxaparin (LOVENOX) injection 40 mg  40 mg SubCUTAneous DAILY    0.9% sodium chloride infusion  75 mL/hr IntraVENous CONTINUOUS    cefTRIAXone (ROCEPHIN) 1 g in sterile water (preservative free) 10 mL IV syringe  1 g IntraVENous Q24H    haloperidol lactate (HALDOL) injection 2 mg  2 mg IntraMUSCular Q6H PRN      OBJECTIVE               Intake/Output Summary (Last 24 hours) at 3/17/2021 0815  Last data filed at 3/16/2021 2059  Gross per 24 hour   Intake 0 ml   Output 0 ml   Net 0 ml       Review of Systems - History obtained from the patient AS PER  HPI    TelemetryNSR , brief run of SVT 160s    PHYSICAL EXAM        Visit Vitals  /70 (BP 1 Location: Left upper arm, BP Patient Position: At rest)   Pulse 82   Temp 98.1 °F (36.7 °C)   Resp 15   Ht 5' 3\" (1.6 m)   Wt 160 lb (72.6 kg)   SpO2 91%   BMI 28.34 kg/m²       Gen: Well-developed, elderly, in no acute distress  alert and oriented x 1-2  HEENT:  Pink conjunctivae, Hearing grossly normal.No scleral icterus or conjunctival, moist mucous membranes  Neck: Supple,No JVD  Resp: No accessory muscle use, Clear breath sounds anterior   Card: Regular Rate,Rythm,2-3/6 murmur,no  rubs or gallop. No thrills.    GI:          soft, non-tender   MSK: No cyanosis or clubbing  Skin: No rashes or ulcers  Neuro:  Cranial nerves are grossly intact, moving all four extremities, no focal deficit, follows commands appropriately  LE: No edema       DATA REVIEW            Laboratory and Imaging have been reviewed by me and are notable for  Recent Labs     03/16/21  0815 03/16/21  0142 03/15/21  1757   TROIQ 0.12* 0.13* 0.08*     Recent Labs     03/16/21  0142 03/15/21  1757    136   K 4.1 4.1   CO2 28 23   BUN 34* 35*   CREA 0.88 0.92   * 95   WBC 9.2 9.4   HGB 9.5* 10.8*   HCT 29.1* 35.0    [address] [facility], NP

## 2021-03-17 NOTE — PROGRESS NOTES
Care Management follow up    Patient admitted for acute encephalopathy, worsening confusion, GLF with right humerus fx. Hx dementia, HTN, breast cancer, hypothyroid.     Lives in Memory Unit at [address]     RUR score 23%/ moderate risk    Current status  Medical management including IV antibiotics, and IV fluids. Evaluation and monitoring of mental status and right humerus fx. Primary Decision Maker (Active): Sandy San Child - [phone]   Secondary Decision Maker: Jeri Morrissey - Daughter - [phone]   Secondary Decision Maker: Huyen  - [phone]    Transition of Care Plan  1. Monitor patient status and response to treatment. 2. Medical management continues. 3. Patient resides at the Memory center at [address] [facility] [address] in [place], will return at [facility]. 4. Await PT/OT evaluations. 5. Palliative consult. 6. Patient walking independently prior to admission.   7. CM to monitor progress and recommendations.     Luda Copeland RN, MSN/Care manager

## 2021-03-17 NOTE — PROGRESS NOTES
[address] [place] [address]: [phone] ([phone])    Patient Name: Zachary Lunsford  YOB: 1934    Date of Initial Consult: 3/16/2021  Reason for Consult: [address] discussion  Requesting Provider: Dr. Trinh Green  Primary Care Physician: Deepak, MD Kecia     SUMMARY:   Zachary Lunsford is a 80 y.o. with a past history of Dementia, HTN, breast Cancer and  hypothyroidism, who presented from [facility] with staff reporting patient had fall 2 days ago, resulting in right arm pain, They also reported patient with increased confusion, lethargy and decreased communication. In ER patient unable to provide ROS, no distress. vitals wnl, UA+ pyuria, normal  white cell count , mild anemia. BUN 35, suggesting patient dehydrated. Troponin elevated, ekg with no overt ischemia, RR.  CT head and CXR both negative for acute process. patient started on empiric ABX. Patient was admitted on 3/15/2021 with a diagnosis of Acute encephalopathy, possible UTI. Review of chart/course of hospitalization: Patient with confusion,  increasing restlessness, becoming agitated, requiring sitter. Troponin trending up, echo ordered, cardiology consulted. palliative exam found patient with right shoulder and upper arm pain and associated  decreased right arm movement , Xray +comminuted, mildly displaced right. Proximal humerus fx, ortho consulted. Current medical issues leading to Palliative Medicine involvement include: GOC discussion      Psychosocial Hx: Born [place] raised in [place]. . Has 3 children, 2 daughters that live locally and 1 son that lives in [place] she also has grandchildren. She is a College graduate, She worked x 30 years for a company in [place] [employer] area. Family report hx of depression after divorce, however she refused therapy and had refused antidepressant. Baseline cognitive and functional status: Ambulated independently, was conversant, recognizes family, but has no short term memory.  Family also report intermittent agitation. PALLIATIVE DIAGNOSES:   1. Altered mental status, unspecified  2. Dementia  3. Dehydration  4. Pruritus  5. Pain  6. Weakness, generalized  7. Advance care planning discussion  8. DNR discussion  9. Goals of care discussion       PLAN:   1. I received call from unit stating daughter at bedside asking to speak with me. I reviewed chart and spoke with her nurse Nevin Brady  2. At bedside was patients daughter Carlos Veras. Patient was off unit for cardiac stress test. I returned to room later in day to evaluate patient at which time she was fatigued, confused, similar to initial visit. 3. Provided medical update to daughter, discussed results of echo and urine cx. Daughter understands that ortho consult pending, but  would like for PT/OT to evaluate as soon as possible, she is very concerned about deconditioning and debility. 4. Pain- Patient initially denied pain, but when asked about right arm, she acknowledged having pain, refusing to move arms, bracing arm. · FU today to determine effectiveness of pain medication regimen. Has order for Acetaminophen 650mg orally every 6 hours x 2 days. Unfortunately, patient had refused medication earlier today when agitated  and subsequent doses were not given, patient was finally sleeping and staff did not want to wake her and risk agitation  5. AMD has been scanned to EMR    6. DNR discussion-patient now has DNR code status. Daughter brought in signed Durable DNR, copy placed in paper record to be scanned to EMR. 7.   · Goals of care discussion- Family would like for patient to work with PT/OT as soon as possible, they are very concerned that if she does not get OOB soon, she will become debilitated. They are hopeful she will be able to walk and return to memory care center. ·  At discharge, they will likely want her to return to the memory care center.    8. initial consult note routed to primary continuity provider and/or primary health care team members  9. Communicated plan of care with: Palliative IDTL[facility]na [facility] Team, [facility] nurse Patric Buerger RN     GOALS OF CARE / TREATMENT PREFERENCES:     GOALS OF CARE:  Patient/Health Care Proxy Stated Goals: Prolong life    TREATMENT PREFERENCES:   Code Status: DNR    Advance Care Planning:  [x] [facility] Interdisciplinary Team has updated the ACP Navigator with Health Care Decision Maker and Patient Capacity      Primary Decision Maker (Active): Lorena Ye Child - [phone]    Secondary Decision Maker: Jason Dhillon - Daughter - [phone]    Secondary Decision Maker: Miguel Koo - [phone]  Advance Care Planning 7/20/2020   Confirm Advance Directive Yes, on file       Medical Interventions: Limited additional interventions     Other Instructions:   Artificially Administered Nutrition: No feeding tube     Other:    As far as possible, the palliative care team has discussed with patient / health care proxy about goals of care / treatment preferences for patient. HISTORY:     History obtained from: medical records/nurse/family/patient    CHIEF COMPLAINT: N/A    HPI/SUBJECTIVE:    The patient is:   [] Verbal and participatory  [x] Non-participatory due to:   Patient is verbal, but not oriented to time, place or situation, unable to tell me why in hospital, unable to provide ros or health hx.     3/17- Patient with intermittent agitation. Ortho consult pending. Cardiology following, ECHO complete, cardiac stress test today.     Clinical Pain Assessment (nonverbal scale for severity on nonverbal patients):   Clinical Pain Assessment  Severity: 4  Location: right shoulder/arm  Character: unable to report  Duration: coule of days  Effect: unable to lift arm  Factors: movement, touch  Frequency: continous     Activity (Movement): Lying quietly, normal position    Duration: for how long has pt been experiencing pain (e.g., 2 days, 1 month, years)  Frequency: how often pain is an issue (e.g., several times per day, once every few days, constant)     FUNCTIONAL ASSESSMENT:     Palliative Performance Scale (PPS):  PPS: 40       PSYCHOSOCIAL/SPIRITUAL SCREENING:     Palliative IDT has assessed this patient for cultural preferences / practices and a referral made as appropriate to needs (Cultural Services, Patient Advocacy, Ethics, etc.)    Any spiritual / Latter-day concerns:  [] Yes /  [x] No    Caregiver Burnout:  [] Yes /  [x] No /  [] No Caregiver Present      Anticipatory grief assessment:   [x] Normal  / [] Maladaptive       ESAS Anxiety: Anxiety: 1    ESAS Depression:          REVIEW OF SYSTEMS:     Positive and pertinent negative findings in ROS are noted above in HPI. The following systems were [] reviewed / [x] unable to be reviewed as noted in HPI  Other findings are noted below. Systems: constitutional, ears/nose/mouth/throat, respiratory, gastrointestinal, genitourinary, musculoskeletal, integumentary, neurologic, psychiatric, endocrine. Positive findings noted below. Modified ESAS Completed by: provider   Fatigue: 4 Drowsiness: 0     Pain: 4   Anxiety: 1 Nausea: 0   Anorexia: 0 Dyspnea: 0                    PHYSICAL EXAM:     From RN flowsheet:  Wt Readings from Last 3 Encounters:   03/17/21 160 lb (72.6 kg)   07/17/20 160 lb 0.9 oz (72.6 kg)   06/05/19 160 lb (72.6 kg)     Blood pressure 124/68, pulse 76, temperature 98.2 °F (36.8 °C), resp. rate 16, height 5' 3\" (1.6 m), weight 160 lb (72.6 kg), SpO2 94 %.     Pain Scale 1: Adult Nonverbal Pain Scale  Pain Intensity 1: 6  Pain Onset 1: Acute  Pain Location 1: Arm(? Pt giving unclear answers.)     Pain Description 1: Aching  Pain Intervention(s) 1: MD notified (comment)  Last bowel movement, if known:     Constitutional: appears stated age, fatigued, confused, NAD  Eyes: pupils equal, anicteric  ENMT: no nasal discharge, drymucous membranes  Cardiovascular: regular rhythm, distal pulses intact  Respiratory: breathing not labored, symmetric  Gastrointestinal: soft non-tender, +bowel sounds  Musculoskeletal: no deformity, +tenderness of right upper arm and shoulder, no deformity  Skin: warm, dry  Neurologic: fatigued,  confused, oriented to self and family, following simple commands, Decreased movement in right arm, not able to lift arm, is able to bend arm at elbow, hold cup with right hand, but bot able to lift to mouth, is moving Left UE and bilateral LE  Without difficulty  Psychiatric: unable to assess  Other:       HISTORY:     Active Problems:    Acute encephalopathy (3/15/2021)      HTN (hypertension) (3/15/2021)      Dementia ([facility] [facility] (3/15/2021)      Acquired hypothyroidism (3/15/2021)      Elevated troponin (3/15/2021)      Confusion (3/15/2021)      Past Medical History:   Diagnosis Date    Cancer ([facility]     beast CA    Endocrine disease     hypothyroid    Hypertension       Past Surgical History:   Procedure Laterality Date    BREAST SURGERY PROCEDURE UNLISTED      right mastectomy      No family history on file. History reviewed, no pertinent family history.   Social History     Tobacco Use    Smoking status: Former Smoker    Smokeless tobacco: Never Used   Substance Use Topics    Alcohol use: Not Currently     Allergies   Allergen Reactions    Sulfa (Sulfonamide Antibiotics) Anaphylaxis    Neosporin [Hydrocortisone] Hives      Current Facility-Administered Medications   Medication Dose Route Frequency    atorvastatin (LIPITOR) tablet 20 mg  20 mg Oral QHS    pantothenic ac-min oil-pet,hyd (AQUAPHOR) 41 % ointment   Topical BID    regadenoson (LEXISCAN) injection 0.4 mg  0.4 mg IntraVENous RAD ONCE    amLODIPine (NORVASC) tablet 2.5 mg  2.5 mg Oral DAILY    L.acidophilus-paracasei-S.thermophil-bifidobacter (RISAQUAD) 8 billion cell capsule  1 Cap Oral DAILY    levothyroxine (SYNTHROID) tablet 50 mcg  50 mcg Oral ACB    LORazepam (ATIVAN) tablet 0.25 mg  0.25 mg Oral Q8H PRN    metoprolol tartrate (LOPRESSOR) tablet 25 mg  25 mg Oral BID    psyllium husk-aspartame (METAMUCIL FIBER) packet 1 Packet  1 Packet Oral DAILY    risperiDONE (RisperDAL) tablet 0.25 mg  0.25 mg Oral BID    sertraline (ZOLOFT) tablet 150 mg  150 mg Oral DAILY    labetaloL (NORMODYNE;TRANDATE) 20 mg/4 mL (5 mg/mL) injection 20 mg  20 mg IntraVENous Q4H PRN    hydrALAZINE (APRESOLINE) 20 mg/mL injection 20 mg  20 mg IntraVENous Q4H PRN    melatonin (rapid dissolve) tablet 5 mg  5 mg Oral QHS PRN    LORazepam (ATIVAN) injection 0.5 mg  0.5 mg IntraVENous Q6H PRN    oxyCODONE IR (ROXICODONE) tablet 5 mg  5 mg Oral Q4H PRN    diphenhydrAMINE (BENADRYL) injection 25 mg  25 mg IntraVENous Q6H PRN    diphenhydrAMINE (BENADRYL) capsule 25 mg  25 mg Oral Q6H PRN    albuterol (PROVENTIL VENTOLIN) nebulizer solution 2.5 mg  2.5 mg Nebulization Q4H PRN    simethicone (MYLICON) tablet 80 mg  80 mg Oral QID PRN    aspirin chewable tablet 81 mg  81 mg Oral DAILY    morphine injection 1 mg  1 mg IntraVENous Q4H PRN    acetaminophen (TYLENOL) tablet 650 mg  650 mg Oral Q6H    sodium chloride (NS) flush 5-40 mL  5-40 mL IntraVENous Q8H    sodium chloride (NS) flush 5-40 mL  5-40 mL IntraVENous PRN    acetaminophen (TYLENOL) tablet 650 mg  650 mg Oral Q6H PRN    Or    acetaminophen (TYLENOL) suppository 650 mg  650 mg Rectal Q6H PRN    polyethylene glycol (MIRALAX) packet 17 g  17 g Oral DAILY PRN    promethazine (PHENERGAN) tablet 12.5 mg  12.5 mg Oral Q6H PRN    Or    ondansetron (ZOFRAN) injection 4 mg  4 mg IntraVENous Q6H PRN    enoxaparin (LOVENOX) injection 40 mg  40 mg SubCUTAneous DAILY    0.9% sodium chloride infusion  75 mL/hr IntraVENous CONTINUOUS    cefTRIAXone (ROCEPHIN) 1 g in sterile water (preservative free) 10 mL IV syringe  1 g IntraVENous Q24H    haloperidol lactate (HALDOL) injection 2 mg  2 mg IntraMUSCular Q6H PRN          LAB AND IMAGING FINDINGS:     Lab Results   Component Value Date/Time    WBC 5.6 03/17/2021 08:43 AM    HGB 9.0 (L) 03/17/2021 08:43 AM    PLATELET 900 24/54/4737 08:43 AM     Lab Results   Component Value Date/Time    Sodium 141 03/17/2021 08:43 AM    Potassium 3.6 03/17/2021 08:43 AM    Chloride 109 (H) 03/17/2021 08:43 AM    CO2 27 03/17/2021 08:43 AM    BUN 24 (H) 03/17/2021 08:43 AM    Creatinine 0.87 03/17/2021 08:43 AM    Calcium 7.8 (L) 03/17/2021 08:43 AM      Lab Results   Component Value Date/Time    Alk. phosphatase 81 03/15/2021 05:57 PM    Protein, total 6.6 03/15/2021 05:57 PM    Albumin 3.3 (L) 03/15/2021 05:57 PM    Globulin 3.3 03/15/2021 05:57 PM     No results found for: INR, PTMR, PTP, PT1, PT2, APTT, INREXT, INREXT   No results found for: IRON, FE, TIBC, IBCT, PSAT, FERR   No results found for: PH, PCO2, PO2  No components found for: GLPOC   No results found for: CPK, CKMB             Total time: 35 min  Counseling / coordination time, spent as noted above: 25 min    > 50% counseling / coordination?: yes    Prolonged service was provided for  []30 min   []75 min in face to face time in the presence of the patient, spent as noted above. Time Start:   Time End:   Note: this can only be billed with 12873 (initial) or 79749 (follow up). If multiple start / stop times, list each separately.

## 2021-03-17 NOTE — ROUTINE PROCESS
Received swallow evaluation. Patient at stress test for hours, then still not rousing. Restarted diet that was on hold for ortho. Daughter reported tot RN that she sometimes coughs on water. SLP will follow in am for evaluation.

## 2021-03-17 NOTE — CONSULTS
Brief Ortho:    Patient not in room at this time. Chart and Imaging reviewed. Right proximal humerus fracture in patient with PMH advanced dementia with current encephalopathy, SVT, and elevated troponins. Plan conservative treatment. ALEXSANDER Harvey. OK H/W/E ROM as carla. Office FU [facility] weeks to discuss advancement of activity. Will attempt to see patient later today. Addendum 17:30. Returned to see patient this evening. Patient denies shoulder pain. She politely declines my exam.  She is drowsy. Nurse states patient able to use right hand without difficulty. Skin intact right shoulder with very small superficial abrasions. Sling intact. Palp radial pulse. Plan as above. D/W daughter. Dr. Jayla Singletary is aware.     DARIO Rodgers

## 2021-03-17 NOTE — PROGRESS NOTES
0500 Hour: Received a call from [facility]. Patient had run SVT, rate 140 range. Patient assessed resting with her eyes closed. Sinus rhythm, HR 80 bpm .Sitter at bedside, states patient had tried to get up, legs on side of bed. Will continue to monitor heart rate, and rhythm. Bedside, Verbal and  shift change report given to [place] (oncoming nurse) by Dominique Lemon RN (offgoing nurse). Report included the following information SBAR, Kardex, Intake/Output, MAR, Recent Results and Cardiac Rhythm Sinus rhythm.

## 2021-03-17 NOTE — PROGRESS NOTES
Bedside shift change report given to Ej Landeros RN (oncoming nurse) by Amadeo Timmons RN (offgoing nurse). Report included the following information SBAR, Kardex, ED Summary, MAR, Recent Results and Cardiac Rhythm NSR. Bedside shift change report given to Bea [address] [place] [address] (oncoming nurse) by Ej Landeros RN (offgoing nurse). Report included the following information SBAR, Kardex, ED Summary, MAR, Recent Results and Cardiac Rhythm NSR.

## 2021-03-17 NOTE — PROGRESS NOTES
Physical Therapy Note:  Pt off flr for stress test.  Will continue to follow for PT evaluation. Orders acknowledged.  Thu Handley, PT

## 2021-03-17 NOTE — WOUND CARE
New Consult for dry skin rash ASSESSMENT: 
Chart reviewed; patient orient to place,person. Quite sleepy at time. Laying in [employer] bed. Complains of pain on right upper arm, chart noted right brachial fracture. Nurse to place immobilizer on. Sitter in room. Patient attempts to assists in repositioning. Incontinent of yellow urine around [employer]. Incontinent care given with staff nurse Edwige Cruz Diet NPO All skin folds and bony prominences assessed Mihir cheeks Bilateral heels boggy dry skin, no redness,skin intact Heels offloaded with pillows . Buttocks and sacral skin intact, moist and blanchable redness. Zinc applied. Patient repositioned onto left side. POA red raised dry skin/rash/ over biliateral legs, bilateral arms, back,abdomen and chest. Appears chronic due to multiple areas of scabbing from patient scratching. Petroleum jelly applied POA left knee blister =1.3x1cm brown/black soft boggy flap intact. Blanchable redness around wound. no drainage noted. Foam applied. POA left lower posterior leg brown scab 0.5x0.5. intact brown scab, blanchable redness. No drainage noted POA bridge of nose  5x2cm and between eyes 0.4x0.4 light brown scab, edges intact, no drainage, slight redness POA left Knuckle area of 2nd and third finger brown scab 0.5x0.5  Slight redness noted at edges. No drainage, edges intact. Treatment: 
Left knee: cleanse with NSS and cover with foam dressing , change every 2 days. Zinc to redness /moistness at gluteal fold. Applied petroleum jelly to dry skin on arms,legs,back. Recommendations: 
Apply Aquaphor ointment to dry red rash/skin twice a day Left knee: cleanse with NSS and cover with foam dressing , change every 2 days. Turn reposition approximately every 2 hours and offload heels with pillows at all times in bed. Z-guard cream to buttocks and sacrum daily and as needed with incontinence care Minimize layers of linen/-pads under patient to optimize support surface. Discussed with  and ANDRE Limon Head Transition of Care: Plan to follow weekly and left as needed while admitted to hospital.

## 2021-03-17 NOTE — PROGRESS NOTES
Occupational Therapy Note:  Chart reviewed and spoke with nursing. Patient is currently off the floor for a stress test and unavailable for OT evaluation. Will continue to follow.   Omkar Severino, OTR/L

## 2021-03-18 VITALS
SYSTOLIC BLOOD PRESSURE: 141 MMHG | HEART RATE: 56 BPM | BODY MASS INDEX: 25.71 KG/M2 | WEIGHT: 160 LBS | RESPIRATION RATE: 18 BRPM | TEMPERATURE: 98 F | DIASTOLIC BLOOD PRESSURE: 80 MMHG | OXYGEN SATURATION: 95 % | HEIGHT: 66 IN

## 2021-03-18 LAB
ANION GAP SERPL CALC-SCNC: 6 MMOL/L (ref 5–15)
BASOPHILS # BLD: 0.1 K/UL (ref 0–0.1)
BASOPHILS NFR BLD: 1 % (ref 0–1)
BUN SERPL-MCNC: 26 MG/DL (ref 6–20)
BUN/CREAT SERPL: 25 (ref 12–20)
CALCIUM SERPL-MCNC: 8.1 MG/DL (ref 8.5–10.1)
CHLORIDE SERPL-SCNC: 108 MMOL/L (ref 97–108)
CO2 SERPL-SCNC: 26 MMOL/L (ref 21–32)
CREAT SERPL-MCNC: 1.03 MG/DL (ref 0.55–1.02)
DIFFERENTIAL METHOD BLD: ABNORMAL
EOSINOPHIL # BLD: 0.5 K/UL (ref 0–0.4)
EOSINOPHIL NFR BLD: 9 % (ref 0–7)
ERYTHROCYTE [DISTWIDTH] IN BLOOD BY AUTOMATED COUNT: 13.2 % (ref 11.5–14.5)
GLUCOSE SERPL-MCNC: 90 MG/DL (ref 65–100)
HCT VFR BLD AUTO: 30.1 % (ref 35–47)
HGB BLD-MCNC: 9.4 G/DL (ref 11.5–16)
IMM GRANULOCYTES # BLD AUTO: 0 K/UL (ref 0–0.04)
IMM GRANULOCYTES NFR BLD AUTO: 0 % (ref 0–0.5)
LYMPHOCYTES # BLD: 1.1 K/UL (ref 0.8–3.5)
LYMPHOCYTES NFR BLD: 20 % (ref 12–49)
MCH RBC QN AUTO: 28.5 PG (ref 26–34)
MCHC RBC AUTO-ENTMCNC: 31.2 G/DL (ref 30–36.5)
MCV RBC AUTO: 91.2 FL (ref 80–99)
MONOCYTES # BLD: 0.7 K/UL (ref 0–1)
MONOCYTES NFR BLD: 12 % (ref 5–13)
NEUTS SEG # BLD: 3.4 K/UL (ref 1.8–8)
NEUTS SEG NFR BLD: 58 % (ref 32–75)
NRBC # BLD: 0 K/UL (ref 0–0.01)
NRBC BLD-RTO: 0 PER 100 WBC
PLATELET # BLD AUTO: 245 K/UL (ref 150–400)
PMV BLD AUTO: 9.7 FL (ref 8.9–12.9)
POTASSIUM SERPL-SCNC: 3.8 MMOL/L (ref 3.5–5.1)
RBC # BLD AUTO: 3.3 M/UL (ref 3.8–5.2)
SODIUM SERPL-SCNC: 140 MMOL/L (ref 136–145)
WBC # BLD AUTO: 5.8 K/UL (ref 3.6–11)

## 2021-03-18 PROCEDURE — 80048 BASIC METABOLIC PNL TOTAL CA: CPT

## 2021-03-18 PROCEDURE — 74011250637 HC RX REV CODE- 250/637: Performed by: NURSE PRACTITIONER

## 2021-03-18 PROCEDURE — 97161 PT EVAL LOW COMPLEX 20 MIN: CPT

## 2021-03-18 PROCEDURE — 36415 COLL VENOUS BLD VENIPUNCTURE: CPT

## 2021-03-18 PROCEDURE — 74011250637 HC RX REV CODE- 250/637: Performed by: INTERNAL MEDICINE

## 2021-03-18 PROCEDURE — 97165 OT EVAL LOW COMPLEX 30 MIN: CPT

## 2021-03-18 PROCEDURE — 97116 GAIT TRAINING THERAPY: CPT

## 2021-03-18 PROCEDURE — 74011250637 HC RX REV CODE- 250/637: Performed by: HOSPITALIST

## 2021-03-18 PROCEDURE — 74011250636 HC RX REV CODE- 250/636: Performed by: INTERNAL MEDICINE

## 2021-03-18 PROCEDURE — 92610 EVALUATE SWALLOWING FUNCTION: CPT

## 2021-03-18 PROCEDURE — 99232 SBSQ HOSP IP/OBS MODERATE 35: CPT | Performed by: SPECIALIST

## 2021-03-18 PROCEDURE — 97530 THERAPEUTIC ACTIVITIES: CPT

## 2021-03-18 PROCEDURE — 85025 COMPLETE CBC W/AUTO DIFF WBC: CPT

## 2021-03-18 RX ORDER — ACETAMINOPHEN 325 MG/1
650 TABLET ORAL
Qty: 40 TAB | Refills: 0 | Status: SHIPPED
Start: 2021-03-18 | End: 2021-03-28

## 2021-03-18 RX ADMIN — Medication 1 CAPSULE: at 08:00

## 2021-03-18 RX ADMIN — METOPROLOL TARTRATE 25 MG: 25 TABLET, FILM COATED ORAL at 08:00

## 2021-03-18 RX ADMIN — Medication 10 ML: at 05:15

## 2021-03-18 RX ADMIN — OXYCODONE 5 MG: 5 TABLET ORAL at 13:48

## 2021-03-18 RX ADMIN — AMLODIPINE BESYLATE 2.5 MG: 5 TABLET ORAL at 08:00

## 2021-03-18 RX ADMIN — RISPERIDONE 0.25 MG: 0.25 TABLET ORAL at 08:00

## 2021-03-18 RX ADMIN — WHITE PETROLATUM 41 % TOPICAL OINTMENT: OINTMENT at 08:00

## 2021-03-18 RX ADMIN — ENOXAPARIN SODIUM 40 MG: 40 INJECTION SUBCUTANEOUS at 08:00

## 2021-03-18 RX ADMIN — ACETAMINOPHEN 650 MG: 325 TABLET ORAL at 18:27

## 2021-03-18 RX ADMIN — SERTRALINE 150 MG: 50 TABLET, FILM COATED ORAL at 08:00

## 2021-03-18 RX ADMIN — ASPIRIN 81 MG: 81 TABLET, CHEWABLE ORAL at 08:00

## 2021-03-18 RX ADMIN — ACETAMINOPHEN 650 MG: 325 TABLET ORAL at 06:47

## 2021-03-18 RX ADMIN — PSYLLIUM HUSK 1 PACKET: 3.4 POWDER ORAL at 08:00

## 2021-03-18 RX ADMIN — LEVOTHYROXINE SODIUM 50 MCG: 0.05 TABLET ORAL at 06:47

## 2021-03-18 RX ADMIN — ACETAMINOPHEN 650 MG: 325 TABLET ORAL at 10:48

## 2021-03-18 NOTE — PROGRESS NOTES
Bedside shift change report given to Jazmyn Sharp RN (oncoming nurse) by ANDRE Figueredo (offgoing nurse). Report included the following information SBAR, Kardex, ED Summary, MAR, Recent Results and Cardiac Rhythm NSR. I have reviewed discharge instructions with the caregiver. The caregiver verbalized understanding. Bedside shift change report given to Bea [address] [place] [address] (oncoming nurse) by Jazmyn Sharp RN (offgoing nurse). Report included the following information SBAR, Kardex, ED Summary, MAR, Recent Results and Cardiac Rhythm NSR.

## 2021-03-18 NOTE — PALLIATIVE CARE DISCHARGE
The Palliative Medicine team was consulted as part of your / your loved one's care in the hospital. Our team is a supportive service; we strive to relieve suffering and improve quality of life. You identified the following goal(s) as your main focus for healthcare: Patient/Health Care Proxy Stated Goals: Prolong life We reviewed advance care planning information, which includes the following: 
Advance Care Planning 3/18/2021 Patient's Healthcare Decision Maker is: Named in scanned ACP document Confirm Advance Directive Yes, on file Does the patient have other document types Do Not Resuscitate We reviewed / discussed your code status as: DNR 
   Full Code means perform CPR in the event of cardiac arrest 
   [facility] means do NOT perform CPR in the event of cardiac arrest 
   Partial Code means you have specific preferences, please discuss with your health care team 
   No Order means this issue was not addressed / resolved during your stay You have a Durable Do Not Resuscitate Order in place, which should travel with you. When you are in a facility, this form should be placed on your chart. Once you are home, it is recommended that the [facility] form be placed in a visible location such as on the refrigerator or bedroom door. Because of the importance of this information, we are providing you with a printed copy to share with other healthcare providers after this hospitalization is complete. If any of the above information is incomplete or incorrect, please contact the Palliative Medicine team at [phone].

## 2021-03-18 NOTE — PROGRESS NOTES
Problem: Self Care Deficits Care Plan (Adult)  Goal: *Acute Goals and Plan of Care (Insert Text)  Description:   FUNCTIONAL STATUS PRIOR TO ADMISSION: Per chart, pt was ambulatory without use of AD and was mod I to SBA for ADLs. Pt was a resident in memory care facility and per dtr has been there since about mid December 2020. HOME SUPPORT: Pt is a resident of memory care facility and has support of staff. Occupational Therapy Goals  Initiated 3/18/2021  1. Patient will perform grooming with supervision/set-up using R UE as a fine motor assist within 7 day(s). 2.  Patient will perform upper body dressing with minimal assistance within 7 day(s). 3.  Patient will don/doff arm sling at moderate assistance  level within 7 days. 4.  Patient will perform toilet transfers with contact guard assist within 7 day(s). 5.  Patient will perform all aspects of toileting with minimal assistance/contact guard assist within 7 day(s). 6.  Patient will participate in ROM to E/W/H with minimal assistance/contact guard assist for 5 minutes within 7 day(s). 7.  Patient will verbalize/demonstrate R shoulder NWB during ADLs with minimal verbal cues within 7 days. Outcome: Progressing Towards Goal     OCCUPATIONAL THERAPY EVALUATION  Patient: Teo Fernandes (80 y.o. female)  Date: 3/18/2021  Primary Diagnosis: Acute encephalopathy [G93.40]  Elevated troponin [R77.8]        Precautions: Fall; NWB R UE; ROM okay E/W/H per Ortho; Sling to R UE       ASSESSMENT  Based on the objective data described below, the patient presents with decreased strength, impaired balance, decreased activity tolerance, and confusion impacting safety and independnece with ADLs and mobility following admission for acute encephalopathy and weakness/fatigue. Pt with GLF 2 at facility 2 days prior and imaging revealed R proximal humerus fx. Per ortho, conservative tx with sling and NWB at this time and pt cleared to participate.  She is alert, oriented to self only, follows commands, and is cooperative this session. She requires increased assistance for UB ADLs due to immobilized R/dominant UE and benefits from cues to maintain precautions. Max/total A required to manage sling and pt/dtr educated on proper fit. Noted instability in standing and pt requires HHA to L UE during mobility towards bathroom with overall min A. She is agreeable to sit up in chair at end of session with dtr and sitter present. At this time, pt is functioning below her baseline and will require increased assistance and supervision for safety. Due to baseline cognition/confusion, she will most benefit from return to familiar environment, however if facility cannot provide increased assistance, pt will likely require placment. Current Level of Function Impacting Discharge (ADLs/self-care): up to mod/max A for UB ADLs; mod A for toileting; up to mod/max A for LB ADLs; set up to min A for seated feeding; min A grooming; min to mod Ax 1 for ADL transfers    Functional Outcome Measure: The patient scored 25/100 on the Barthel outcome measure which is indicative of moderate to severe functional impairment. Other factors to consider for discharge: h/o dementia; from memory care; NWB to R UE; high fall risk; supportive family     Patient will benefit from skilled therapy intervention to address the above noted impairments. PLAN :  Recommendations and Planned Interventions: self care training, functional mobility training, therapeutic exercise, balance training, therapeutic activities, cognitive retraining, endurance activities, patient education, home safety training and family training/education    Frequency/Duration: Patient will be followed by occupational therapy 5 times a week to address goals.     Recommendation for discharge: (in order for the patient to meet his/her long term goals)  To be determined: return to [facility]/Memory care with follow up [facility] therapy and increased assistance for all ADLs/mobility vs. SNF     This discharge recommendation:  Has been made in collaboration with the attending provider and/or case management    IF patient discharges home will need the following DME: TBD       SUBJECTIVE:   Patient stated “Okay, I'll give it a try.”    OBJECTIVE DATA SUMMARY:   HISTORY:   Past Medical History:   Diagnosis Date   • Cancer (HCC)     beast CA   • Endocrine disease     hypothyroid   • Hypertension      Past Surgical History:   Procedure Laterality Date   • BREAST SURGERY PROCEDURE UNLISTED      right mastectomy       Expanded or extensive additional review of patient history:     Home Situation  Home Environment: (Memory Care)    Hand dominance: Right    EXAMINATION OF PERFORMANCE DEFICITS:  Cognitive/Behavioral Status:  Neurologic State: Alert;Confused  Orientation Level: Oriented to person;Disoriented to place;Disoriented to situation;Disoriented to time  Cognition: Follows commands  Perception: Appears intact  Perseveration: No perseveration noted  Safety/Judgement: Fall prevention;Lack of insight into deficits;Decreased awareness of need for assistance;Decreased awareness of need for safety    Range of Motion:  AROM: Generally decreased, functional(R UE not tested due to fx)    Strength:  Strength: Generally decreased, functional(except R UE)    Coordination:  Coordination: Within functional limits  Fine Motor Skills-Upper: Left Intact;Right Impaired    Gross Motor Skills-Upper: Left Intact;Right Impaired    Tone:  Tone: Normal    Balance:  Sitting: Intact  Standing: Impaired;With support  Standing - Static: Fair  Standing - Dynamic : Fair    Functional Mobility and Transfers for ADLs:  Bed Mobility:  Supine to Sit: Minimum assistance;Assist x1;Additional time;Bed Modified(with HOB elevated)  Sit to Supine: (pt seated up in chair at end of session)    Transfers:  Sit to Stand: Minimum assistance;Moderate assistance;Additional time  Stand to Sit: Minimum assistance  Bed to  Chair: Minimum assistance(with HHA on L )  Toilet Transfer : Minimum assistance; Moderate assistance(infer based on observations)    ADL Assessment:  Feeding: Setup;Minimum assistance    Oral Facial Hygiene/Grooming: Minimum assistance    Bathing: Moderate assistance;Maximum assistance    Upper Body Dressing: Moderate assistance(including max/total A for sling mgmt)    Lower Body Dressing: Moderate assistance;Maximum assistance    Toileting: Moderate assistance    ADL Intervention and task modifications:  Patient instructed and demonstrated shoulder precautions during ADLs with verbal cues. Feeding  Drink to Mouth: Set-up; Training to use affected extremity as a fine motor assistance  Cues: Tactile cues provided;Verbal cues provided;Visual cues provided      Patient instructed and indicated understanding dress R UE first/undress last. Patient instructed and indicated understanding of compensatory strategies to don sling. Upper Body Dressing Assistance  Orthotics(Brace): Maximum assistance; Total assistance (dependent)(for R UE sling mgmt)  Cues: Physical assistance; Tactile cues provided;Verbal cues provided;Visual cues provided    Cognitive Retraining  Safety/Judgement: Fall prevention;Lack of insight into deficits; Decreased awareness of need for assistance;Decreased awareness of need for safety    Therapeutic Exercise:    EXERCISE   Sets   Reps   Active Active Assist   Passive   Comments   Elbow flexion/extension 1 5 []                          []                          [x]                             Wrist flexion/extension 1 10 [x]                          []                          []                             Finger flexion/extension 1 10 [x]                          []                          []                               Functional Measure:  Barthel Index:    Bathin  Bladder: 0  Bowels: 5  Groomin  Dressin  Feedin  Mobility: 0  Stairs: 0  Toilet Use: 5  Transfer (Bed to Chair and Back): 10  Total: 25/100        The Barthel ADL Index: Guidelines  1. The index should be used as a record of what a patient does, not as a record of what a patient could do. 2. The main aim is to establish degree of independence from any help, physical or verbal, however minor and for whatever reason. 3. The need for supervision renders the patient not independent. 4. A patient's performance should be established using the best available evidence. Asking the patient, friends/relatives and nurses are the usual sources, but direct observation and common sense are also important. However direct testing is not needed. 5. Usually the patient's performance over the preceding 24-48 hours is important, but occasionally longer periods will be relevant. 6. Middle categories imply that the patient supplies over 50 per cent of the effort. 7. Use of aids to be independent is allowed. Tori Gray., Barthel, DNAZANIN. (4214). Functional evaluation: the Barthel Index. [address] [facility] (14)2. Christine Neumann lukas NIRMAL Choudhury, Mara Pimentel., Johanna Moscoso., [facility], [address] [place] [address] (1999). Measuring the change indisability after inpatient rehabilitation; comparison of the responsiveness of the Barthel Index and Functional [place] Measure. Journal of Neurology, Neurosurgery, and Psychiatry, 66(4), 946-884. Abi Mobley, N.J.A, LIMA Howe, & Mary Deng MBasilioA. (2004.) Assessment of post-stroke quality of life in cost-effectiveness studies: The usefulness of the Barthel Index and the EuroQoL-5D. Quality of Life Research, 15, 143-12     Occupational Therapy Evaluation Charge Determination   History Examination Decision-Making   LOW Complexity : Brief history review  HIGH Complexity : 5 or more performance deficits relating to physical, cognitive , or psychosocial skils that result in activity limitations and / or participation restrictions HIGH Complexity : Patient presents with comorbidities that affect occupational performance. Signifigant modification of tasks or assistance (eg, physical or verbal) with assessment (s) is necessary to enable patient to complete evaluation       Based on the above components, the patient evaluation is determined to be of the following complexity level: MEDIUM  Pain Rating:  Pt reporting minimal pain in R shoulder during session    Activity Tolerance:   Fair    After treatment patient left in no apparent distress:    Sitting in chair, Call bell within reach, Caregiver / family present and sitter present    COMMUNICATION/EDUCATION:   The patients plan of care was discussed with: Physical therapist, Registered nurse and Case management. Home safety education was provided and the patient/caregiver indicated understanding., Patient/family have participated as able in goal setting and plan of care. and Patient/family agree to work toward stated goals and plan of care. This patients plan of care is appropriate for delegation to [facility].     Thank you for this referral.  Jose Alberto Knight OT  Time Calculation: 22 mins

## 2021-03-18 NOTE — PROGRESS NOTES
Cardiology Progress Note                              [address] [facility]. [address], [place], [address]                                 Phone [phone]; Fax [phone]        3/18/2021 8:15 AM     Admit Date:           3/15/2021  Admit Diagnosis:  Acute encephalopathy [G93.40]  Elevated troponin [R77.8]  :          1934   MRN:          [id number]       Impression Plan/Recommendation   1. Elevated troponin, type ii  2. Acute encephalopathy  3. Moderate dementia   4. Hypertension   5. Anemia   6. Right humerus frx  7. SVT  8. Mod AS               · BP better - continue low dose bb and norvasc - can titrate as needed   · Normal nuclear stress test reviewed w daughter & patient  · Echo showing pef w MAC and a mobile mass present of MV appears to be calcification & mild to moderate AS  · Asa.  statin  · No further SVT seen      Will arrange follow up in the office   Okay CV wise for d/c  We will sign off. Please call if there are any new developments that require cardiology input. CARDIOLOGY ATTENDING  Patient personally seen and examined. All the elements of history and examination were personally performed. Assessment and plan was discussed and agree as written above    NAD. No CP. Hrt RRR. I spoke to daughter and reviewed cardiac findings. No ischemia on stress test.   Mild-to-moderate AS on echo -- can follow as outpatient. Will sign off. Please call if questions. Kathya Marti MD, [facility] [place]              07 - 1900  In: 390 [P.O.:390]  Out: -     Last 3 Recorded Weights in this Encounter    21 1126 21 1041 21 1043   Weight: 160 lb (72.6 kg) 160 lb (72.6 kg) 160 lb (72.6 kg)         1901 -  0700  In: 3370 [P.O.:600;  I.V.:420]  Out: Dann Green denies chest pain or SOB  Feels better  Daughter at bedside        Current Facility-Administered Medications   Medication Dose Route Frequency    atorvastatin (LIPITOR) tablet 20 mg  20 mg Oral QHS    pantothenic ac-min oil-pet,hyd (AQUAPHOR) 41 % ointment   Topical BID    haloperidol lactate (HALDOL) injection 2 mg  2 mg IntraMUSCular Q8H PRN    amLODIPine (NORVASC) tablet 2.5 mg  2.5 mg Oral DAILY    L.acidophilus-paracasei-S.thermophil-bifidobacter (RISAQUAD) 8 billion cell capsule  1 Cap Oral DAILY    levothyroxine (SYNTHROID) tablet 50 mcg  50 mcg Oral ACB    LORazepam (ATIVAN) tablet 0.25 mg  0.25 mg Oral Q8H PRN    metoprolol tartrate (LOPRESSOR) tablet 25 mg  25 mg Oral BID    psyllium husk-aspartame (METAMUCIL FIBER) packet 1 Packet  1 Packet Oral DAILY    risperiDONE (RisperDAL) tablet 0.25 mg  0.25 mg Oral BID    sertraline (ZOLOFT) tablet 150 mg  150 mg Oral DAILY    labetaloL (NORMODYNE;TRANDATE) 20 mg/4 mL (5 mg/mL) injection 20 mg  20 mg IntraVENous Q4H PRN    hydrALAZINE (APRESOLINE) 20 mg/mL injection 20 mg  20 mg IntraVENous Q4H PRN    melatonin (rapid dissolve) tablet 5 mg  5 mg Oral QHS PRN    LORazepam (ATIVAN) injection 0.5 mg  0.5 mg IntraVENous Q6H PRN    oxyCODONE IR (ROXICODONE) tablet 5 mg  5 mg Oral Q4H PRN    diphenhydrAMINE (BENADRYL) injection 25 mg  25 mg IntraVENous Q6H PRN    diphenhydrAMINE (BENADRYL) capsule 25 mg  25 mg Oral Q6H PRN    albuterol (PROVENTIL VENTOLIN) nebulizer solution 2.5 mg  2.5 mg Nebulization Q4H PRN    simethicone (MYLICON) tablet 80 mg  80 mg Oral QID PRN    aspirin chewable tablet 81 mg  81 mg Oral DAILY    morphine injection 1 mg  1 mg IntraVENous Q4H PRN    acetaminophen (TYLENOL) tablet 650 mg  650 mg Oral Q6H    sodium chloride (NS) flush 5-40 mL  5-40 mL IntraVENous Q8H    sodium chloride (NS) flush 5-40 mL  5-40 mL IntraVENous PRN    acetaminophen (TYLENOL) tablet 650 mg  650 mg Oral Q6H PRN    Or    acetaminophen (TYLENOL) suppository 650 mg  650 mg Rectal Q6H PRN    polyethylene glycol (MIRALAX) packet 17 g  17 g Oral DAILY PRN    promethazine (PHENERGAN) tablet 12.5 mg  12.5 mg Oral Q6H PRN    Or    ondansetron (ZOFRAN) injection 4 mg  4 mg IntraVENous Q6H PRN    enoxaparin (LOVENOX) injection 40 mg  40 mg SubCUTAneous DAILY    0.9% sodium chloride infusion  100 mL/hr IntraVENous CONTINUOUS      OBJECTIVE               Intake/Output Summary (Last 24 hours) at 3/18/2021 1005  Last data filed at 3/18/2021 0818  Gross per 24 hour   Intake 1410 ml   Output 600 ml   Net 810 ml       Review of Systems - History obtained from the patient AS PER  HPI    Telemetry NSR     PHYSICAL EXAM        Visit Vitals  BP (!) 140/79 (BP 1 Location: Left upper arm, BP Patient Position: Sitting)   Pulse 98   Temp 97 °F (36.1 °C)   Resp 18   Ht 5' 6\" (1.676 m)   Wt 160 lb (72.6 kg)   SpO2 91%   BMI 25.82 kg/m²       Gen: Well-developed, elderly, in no acute distress  alert and oriented x 1-2  HEENT:  Pink conjunctivae, Hearing grossly normal.No scleral icterus or conjunctival, moist mucous membranes  Neck: Supple,No JVD  Resp: No accessory muscle use, Clear breath sounds anterior   Card: Regular Rate,Rythm,2-3/6 murmur,no  rubs or gallop. No thrills.    GI:          soft, non-tender   MSK: No cyanosis or clubbing  Skin: No rashes or ulcers  Neuro:  Cranial nerves are grossly intact, moving all four extremities, no focal deficit, follows commands appropriately  LE: No edema       DATA REVIEW            Laboratory and Imaging have been reviewed by me and are notable for  Recent Labs     03/16/21  0815 03/16/21  0142 03/15/21  1757   TROIQ 0.12* 0.13* 0.08*     Recent Labs     03/18/21  0337 03/17/21  0843 03/16/21  0142    141 139   K 3.8 3.6 4.1   CO2 26 27 28   BUN 26* 24* 34*   CREA 1.03* 0.87 0.88   GLU 90 88 108*   WBC 5.8 5.6 9.2   HGB 9.4* 9.0* 9.5*   HCT 30.1* 28.0* 29.1*    [address], [place]

## 2021-03-18 NOTE — PROGRESS NOTES
Physician Progress Note      Jess Camarillo  CSN #:                  [id number]  :                       1934  ADMIT DATE:       3/15/2021 5:01 PM  [address] [place] DATE:  RESPONDING  PROVIDER #:        Junie Stahl MD          QUERY TEXT:    Patient admitted with Acute metabolic encephalopathy. Noted Conflicting documentation, H&P states \"NSTEMI\"  Cardio NP note 3/18 states \"Elevated troponin, type ii\"  Cardio MD 3/17 consult states \"Troponin flat, minimally elevated. \"  If possible, please document in progress notes and discharge summary if you are evaluating and /or treating any of the following: The medical record reflects the following:  Risk Factors: 7 yo F admitted with Metabolic encephalopathy, pyuria &  dehydration  Clinical Indicators: Troponins ,08 .13 .12  EKG Normal but some SVT noted  Echo showing pef w MAC and a mobile mass present of MV appears to be calcification & mild to moderate AS  Noted Conflicting documentation, H&P states \"NSTEMI\"  Cardio NP note 3/18 states \"Elevated troponin, type ii\"  Cardio MD 3/17 consult states \"Troponin flat, minimally elevated. \"  \"Likely Non--MI troponin elevation  - minimal and flat trop elevation (maybe from dehydration) \"  Treatment: echo, Asa.  statin  Options provided:  -- NSTEMI POA confirmed  -- Type 2 MI due to demand ischemia from SVT & dehydration POA confirmed  -- Demand Ischemia only, no MI  -- NSTEMI & Type 2 MI ruled out, elevated troponins not clinically significant  -- Other - I will add my own diagnosis  -- Disagree - Not applicable / Not valid  -- Disagree - Clinically unable to determine / Unknown  -- Refer to Clinical Documentation Reviewer    PROVIDER RESPONSE TEXT:    This patient has a Type 2 MI POA due to demand ischemia from SVT & dehydration confirmed.     Query created by: Grant Coe on 3/18/2021 2:20 PM      Electronically signed by:  Junie Stahl MD 3/18/2021 6:40 PM

## 2021-03-18 NOTE — PROGRESS NOTES
Problem: Falls - Risk of  Goal: *Absence of Falls  Description: Document Akil Mejia Fall Risk and appropriate interventions in the flowsheet. Outcome: Progressing Towards Goal  Note: Fall Risk Interventions:  Mobility Interventions: Bed/chair exit alarm, Patient to call before getting OOB    Mentation Interventions: Adequate sleep, hydration, pain control, Bed/chair exit alarm, Reorient patient    Medication Interventions: Patient to call before getting OOB, Bed/chair exit alarm, Teach patient to arise slowly    Elimination Interventions: Bed/chair exit alarm, Call light in reach, Patient to call for help with toileting needs, Toileting schedule/hourly rounds    History of Falls Interventions: Bed/chair exit alarm         Problem: Patient Education: Go to Patient Education Activity  Goal: Patient/Family Education  Outcome: Progressing Towards Goal     Problem: Pressure Injury - Risk of  Goal: *Prevention of pressure injury  Description: Document Roger Scale and appropriate interventions in the flowsheet.   Outcome: Progressing Towards Goal  Note: Pressure Injury Interventions:  Sensory Interventions: Assess changes in LOC, Keep linens dry and wrinkle-free, Minimize linen layers    Moisture Interventions: Absorbent underpads, Minimize layers    Activity Interventions: PT/OT evaluation, Pressure redistribution bed/mattress(bed type)    Mobility Interventions: Float heels, HOB 30 degrees or less    Nutrition Interventions: Document food/fluid/supplement intake, Offer support with meals,snacks and hydration    Friction and Shear Interventions: HOB 30 degrees or less, Minimize layers, Lift sheet                Problem: Patient Education: Go to Patient Education Activity  Goal: Patient/Family Education  Outcome: Progressing Towards Goal     Problem: Pain  Goal: *Control of Pain  Outcome: Progressing Towards Goal  Goal: *PALLIATIVE CARE:  Alleviation of Pain  Outcome: Progressing Towards Goal     Problem: General Medical Care Plan  Goal: *Vital signs within specified parameters  Outcome: Progressing Towards Goal  Goal: *Labs within defined limits  Outcome: Progressing Towards Goal  Goal: *Absence of infection signs and symptoms  Outcome: Progressing Towards Goal  Goal: *Optimal pain control at patient's stated goal  Outcome: Progressing Towards Goal  Goal: *Skin integrity maintained  Outcome: Progressing Towards Goal  Goal: *Fluid volume balance  Outcome: Progressing Towards Goal  Goal: *Optimize nutritional status  Outcome: Progressing Towards Goal  Goal: *Anxiety reduced or absent  Outcome: Progressing Towards Goal  Goal: *Progressive mobility and function (eg: ADL's)  Outcome: Progressing Towards Goal     Problem: Risk for Spread of Infection  Goal: Prevent transmission of infectious organism to others  Description: Prevent the transmission of infectious organisms to other patients, staff members, and visitors.   Outcome: Progressing Towards Goal     Problem: Patient Education:  Go to Education Activity  Goal: Patient/Family Education  Outcome: Progressing Towards Goal

## 2021-03-18 NOTE — PROGRESS NOTES
Occupational Therapy:  03/18/21    Orders received, chart reviewed and patient evaluated by occupational therapy. Pending progression with skilled acute occupational therapy, recommend: To be determined: return to correction with follow up [address] and increased assistance for all mobility and ADLs     Recommend with nursing ADLs with supervision/setup, OOB to chair 3x/day and toileting via functional mobility to and from bathroom with 1 person assist and SPC vs. hand held assist. Thank you for completing as able in order to maintain patient strength, endurance and independence. Full evaluation to follow.      Thank you,  Day Garcia, OTR/L

## 2021-03-18 NOTE — DISCHARGE SUMMARY
Physician Discharge Summary     Patient ID:  Stacia Carmona  [id number]  2 y.o.  1934    Admit date: 3/15/2021    Discharge date and time: 3/18/2021    Admission Diagnoses: Acute encephalopathy [G93.40]  Elevated troponin [R77.8]    Discharge Diagnoses: Active Problems:    Acute encephalopathy (3/15/2021)      HTN (hypertension) (3/15/2021)      Dementia ([address] (3/15/2021)      Acquired hypothyroidism (3/15/2021)      Elevated troponin (3/15/2021)      Confusion (3/15/2021)           Hospital Course:   Ms. Yariel Kellogg is a 80 y.o.  female who is admitted with worsening confusion. Ms. Yariel Kellogg with past medical history of advanced dementia, resides in a memory center, hypertension, breast cancer, hypothyroidism presented to ER a fall 2 days ago with right arm injury. Since then patient has been more confused, lethargic and noncommunicating as he used to be. Daughter stated that no fever, diarrhea, cough, nausea, vomiting. R humerus fracture: Patient was seen and evaluated by Orthopedics. Management will be conservative with sling and NWB. She will follow up with Ortho in 1-2 weeks    Elevated troponins: Without chest pain. She underwent stress test, which was unremarkable    Dementia: There was concern that she did have worsened confusion on day of arrival, which was likely due to pain from fracture. She was empirically treated for UTI, but abx were stopped when culture had no growth.      HTN: Continued home meds      PCP: Other, Phys, MD     Consults: Cardiology and Orthopedic Surgery    Condition of patient at discharge: good    Discharge Exam:  Physical Exam:    Gen: Well-developed, well-nourished, in no acute distress  HEENT:  Pink conjunctivae, PERRL, hearing intact to voice, moist mucous membranes  Neck: Supple, without masses, thyroid non-tender  Resp: No accessory muscle use, clear breath sounds without wheezes rales or rhonchi  Card: No murmurs, normal S1, S2 without thrills, bruits or peripheral edema  Abd:  Soft, non-tender, non-distended, normoactive bowel sounds are present, no palpable organomegaly and no detectable hernias  Lymph:  No cervical or inguinal adenopathy  Musc: No cyanosis or clubbing; RUE in sling  Skin: No rashes or ulcers, skin turgor is good  Neuro:  Cranial nerves are grossly intact, no focal motor weakness, follows commands appropriately  Psych:  Good insight, oriented to person, place and time, alert            Disposition: [facility]    Patient Instructions:   Current Discharge Medication List      START taking these medications    Details   !! acetaminophen (TYLENOL) 325 mg tablet Take 2 Tabs by mouth every six (6) hours as needed for Pain for up to 10 days. Qty: 40 Tab, Refills: 0       !! - Potential duplicate medications found. Please discuss with provider. CONTINUE these medications which have NOT CHANGED    Details   therapeutic multivitamin (THERAGRAN) tablet Take 1 Tab by mouth daily. gentamicin (GARAMYCIN) 0.1 % topical ointment Apply  to affected area three (3) times daily. Apply to open wound on the leg      Lactobacillus acidophilus (BACID) cap Take 1 Cap by mouth daily. psyllium (METAMUCIL) packet Take 1 Packet by mouth daily. sertraline (ZOLOFT) 100 mg tablet Take 150 mg by mouth daily. triamcinolone acetonide (KENALOG) 0.025 % topical cream Apply  to affected area every other day. use thin layer  Apply topically every other day for active rashes on the legs for rash and other nonspecific skin      mupirocin (BACTROBAN) 2 % ointment Apply  to affected area three (3) times daily. Apply topically for open wounds on legs      !! acetaminophen (TylenoL) 325 mg tablet Take 325 mg by mouth two (2) times daily as needed for Pain (leg pain). LORazepam (ATIVAN) 0.5 mg tablet Take 0.25 mg by mouth every eight (8) hours as needed for Anxiety. risperiDONE (RisperDAL) 0.25 mg tablet Take 0.25 mg by mouth two (2) times a day.       levothyroxine (SYNTHROID) 50 mcg tablet Take 50 mcg by mouth Daily (before breakfast). amLODIPine (NORVASC) 2.5 mg tablet Take 2.5 mg by mouth daily. metoprolol tartrate (LOPRESSOR) 25 mg tablet Take 25 mg by mouth two (2) times a day. cholecalciferol (Vitamin D3) 25 mcg (1,000 unit) cap Take  by mouth daily. !! - Potential duplicate medications found. Please discuss with provider. Approximate time spent in patient care on day of discharge: 35 min    Signed:   Sofía Marroquin MD  3/18/2021  12:28 PM

## 2021-03-18 NOTE — PROGRESS NOTES
SPEECH PATHOLOGY BEDSIDE SWALLOW EVALUATION/DISCHARGE  Patient: Dena Leventhal (80 y.o. female)  Date: 3/18/2021  Primary Diagnosis: Acute encephalopathy [G93.40]  Elevated troponin [R77.8]       Precautions: aspiration       ASSESSMENT :  Based on the objective data described below, the patient presents with functional swallow. Admitted 3-15-21 with AMs, decreased ability to talk, walk. She had a fall  2 days ago and now has humurus fx. CXR: negative. RN reported cough with cup drinking. PMH: +h/o tobacco, hypothyroid, breast CA with R mastectomy, HTN, senior living, dementia. .  Skilled acute therapy provided by a speech-language pathologist is not indicated at this time. PLAN :  Recommendations:  Ok for regular diet, thin liquids. Discharge Recommendations: None     SUBJECTIVE:   Patient agreeable to drinks, including  Part of metamucil. .    OBJECTIVE:     Past Medical History:   Diagnosis Date    Cancer ([facility] [address]     beast CA    Endocrine disease     hypothyroid    Hypertension      Past Surgical History:   Procedure Laterality Date    BREAST SURGERY PROCEDURE UNLISTED      right mastectomy     Prior Level of Function/Home Situation: senior living     Diet prior to admission: regular, thins  Current Diet:  Regualr, thins   Cognitive and Communication Status:  Neurologic State: Confused  Orientation Level: Oriented to person, Unable to verbalize  Cognition: Follows commands  Perception: Appears intact        Oral Assessment:  Oral Assessment  Labial: No impairment  Dentition: Natural;Limited  Oral Hygiene: mildly dry  Lingual: No impairment  P.O. Trials:  Patient Position: upright in bed  Vocal quality prior to P.O.: No impairment  Consistency Presented:  Thin liquid(patient had just finished breakfast with no oral residue)  How Presented: Self-fed/presented;Spoon;Straw;Successive swallows   ORAL PHASE:   Bolus Acceptance: No impairment  Bolus Formation/Control: No impairment     Propulsion: No impairment  Oral Residue: None PHARYNGEAL PHASE:   Initiation of Swallow: No impairment  Laryngeal Elevation: Functional  Aspiration Signs/Symptoms: None  Pharyngeal Phase Characteristics: No impairment, issues, or problems (RN reports much better startinglast night-no coughing wtih thins or pills)           Daughter reported h/o coughing with water at times preadmission to RN yesterday. NOMS:   The NOMS functional outcome measure was used to quantify this patient's level of swallowing impairment. Based on the NOMS, the patient was determined to be at level 6 for swallow function     NOMS Swallowing Levels:  Level 1 (CN): NPO  Level 2 (CM): NPO but takes consistency in therapy  Level 3 (CL): Takes less than 50% of nutrition p.o. and continues with nonoral feedings; and/or safe with mod cues; and/or max diet restriction  Level 4 (CK): Safe swallow but needs mod cues; and/or mod diet restriction; and/or still requires some nonoral feeding/supplements  Level 5 (CJ): Safe swallow with min diet restriction; and/or needs min cues  Level 6 (CI): Independent with p.o.; rare cues; usually self cues; may need to avoid some foods or needs extra time  Level 7 ([facility]): Independent for all p.o.  LISA. (2003). National Outcomes Measurement System (NOMS): Adult Speech-Language Pathology User's Guide. Pain:  Pain Scale 1: Numeric (0 - 10)  Pain Intensity 1: 0     After treatment:   Patient left in no apparent distress in bed and Nursing notified    COMMUNICATION/EDUCATION:   Patient was educated regarding her deficit(s) of potential dyspahgia  as this relates to her diagnosis of dementia. She demonstrated Fair understanding as evidenced by DISCUSSION. .    The patient's plan of care including recommendations, planned interventions, and recommended diet changes were discussed with: Registered nurse PCT.      Thank you for this referral.  Marilia Giron, SLP  Time Calculation: 10 mins

## 2021-03-18 NOTE — PROGRESS NOTES
CM follow up:    Clinicals faxed to Jelena Mack at [address] in [place] at [phone]. Discussed patient's return later today. She will review clinical notes and call, await PT/OT evaluations. Patient is open to [facility] for nursing and PT. Will need resumption of care for home health order. 1:00pm  Resumption of care order obtained, sent to [facility] via [employer]. PT/OT evaluations faxed to Jelena Mack at [place]. Await response to facilitate discharge. 1:30pm  Patient ACCEPTED by [facility], PT to evaluate patient for cane. Referral sent and accepted by [facility] for transport with  at 1830. [facility] form attached to [employer] referral, form with face sheet and H&P placed on front of chart. AVS faxed to Jelena Mack at [address] at [phone].     Monie Daley RN, MSN/Care manager  [phone]

## 2021-03-23 ENCOUNTER — APPOINTMENT (OUTPATIENT)
Dept: GENERAL RADIOLOGY | Age: 86
End: 2021-03-23
Attending: EMERGENCY MEDICINE
Payer: MEDICARE

## 2021-03-23 ENCOUNTER — HOSPITAL ENCOUNTER (EMERGENCY)
Age: 86
Discharge: HOME | End: 2021-03-23
Attending: EMERGENCY MEDICINE | Admitting: EMERGENCY MEDICINE
Payer: MEDICARE

## 2021-03-23 ENCOUNTER — APPOINTMENT (OUTPATIENT)
Dept: CT IMAGING | Age: 86
End: 2021-03-23
Attending: EMERGENCY MEDICINE
Payer: MEDICARE

## 2021-03-23 VITALS
TEMPERATURE: 98.1 F | HEIGHT: 66 IN | BODY MASS INDEX: 27.74 KG/M2 | WEIGHT: 172.6 LBS | RESPIRATION RATE: 18 BRPM | DIASTOLIC BLOOD PRESSURE: 81 MMHG | OXYGEN SATURATION: 99 % | SYSTOLIC BLOOD PRESSURE: 145 MMHG | HEART RATE: 57 BPM

## 2021-03-23 DIAGNOSIS — R00.1 SINUS BRADYCARDIA: ICD-10-CM

## 2021-03-23 DIAGNOSIS — R93.89 ABNORMAL CHEST X-RAY: ICD-10-CM

## 2021-03-23 DIAGNOSIS — R40.0 SOMNOLENCE: Primary | ICD-10-CM

## 2021-03-23 DIAGNOSIS — R79.89 ELEVATED BRAIN NATRIURETIC PEPTIDE (BNP) LEVEL: ICD-10-CM

## 2021-03-23 DIAGNOSIS — R77.8 ELEVATED TROPONIN: ICD-10-CM

## 2021-03-23 LAB
ALBUMIN SERPL-MCNC: 2.7 G/DL (ref 3.5–5)
ALBUMIN/GLOB SERPL: 1 {RATIO} (ref 1.1–2.2)
ALP SERPL-CCNC: 80 U/L (ref 45–117)
ALT SERPL-CCNC: 22 U/L (ref 12–78)
ANION GAP SERPL CALC-SCNC: 6 MMOL/L (ref 5–15)
APPEARANCE UR: CLEAR
AST SERPL-CCNC: 33 U/L (ref 15–37)
BACTERIA URNS QL MICRO: NEGATIVE /HPF
BASOPHILS # BLD: 0.1 K/UL (ref 0–0.1)
BASOPHILS NFR BLD: 1 % (ref 0–1)
BILIRUB SERPL-MCNC: 0.3 MG/DL (ref 0.2–1)
BILIRUB UR QL: NEGATIVE
BNP SERPL-MCNC: 4332 PG/ML
BUN SERPL-MCNC: 19 MG/DL (ref 6–20)
BUN/CREAT SERPL: 24 (ref 12–20)
CALCIUM SERPL-MCNC: 7.5 MG/DL (ref 8.5–10.1)
CHLORIDE SERPL-SCNC: 107 MMOL/L (ref 97–108)
CO2 SERPL-SCNC: 23 MMOL/L (ref 21–32)
COLOR UR: NORMAL
CREAT SERPL-MCNC: 0.78 MG/DL (ref 0.55–1.02)
DIFFERENTIAL METHOD BLD: ABNORMAL
EOSINOPHIL # BLD: 0.5 K/UL (ref 0–0.4)
EOSINOPHIL NFR BLD: 5 % (ref 0–7)
EPITH CASTS URNS QL MICRO: NORMAL /LPF
ERYTHROCYTE [DISTWIDTH] IN BLOOD BY AUTOMATED COUNT: 13 % (ref 11.5–14.5)
GLOBULIN SER CALC-MCNC: 2.7 G/DL (ref 2–4)
GLUCOSE SERPL-MCNC: 100 MG/DL (ref 65–100)
GLUCOSE UR STRIP.AUTO-MCNC: NEGATIVE MG/DL
HCT VFR BLD AUTO: 34.2 % (ref 35–47)
HGB BLD-MCNC: 10.5 G/DL (ref 11.5–16)
HGB UR QL STRIP: NEGATIVE
HYALINE CASTS URNS QL MICRO: NORMAL /LPF (ref 0–5)
IMM GRANULOCYTES # BLD AUTO: 0.1 K/UL (ref 0–0.04)
IMM GRANULOCYTES NFR BLD AUTO: 1 % (ref 0–0.5)
KETONES UR QL STRIP.AUTO: NEGATIVE MG/DL
LEUKOCYTE ESTERASE UR QL STRIP.AUTO: NEGATIVE
LYMPHOCYTES # BLD: 1.4 K/UL (ref 0.8–3.5)
LYMPHOCYTES NFR BLD: 15 % (ref 12–49)
MCH RBC QN AUTO: 27.9 PG (ref 26–34)
MCHC RBC AUTO-ENTMCNC: 30.7 G/DL (ref 30–36.5)
MCV RBC AUTO: 91 FL (ref 80–99)
MONOCYTES # BLD: 1.1 K/UL (ref 0–1)
MONOCYTES NFR BLD: 12 % (ref 5–13)
NEUTS SEG # BLD: 6.1 K/UL (ref 1.8–8)
NEUTS SEG NFR BLD: 66 % (ref 32–75)
NITRITE UR QL STRIP.AUTO: NEGATIVE
NRBC # BLD: 0 K/UL (ref 0–0.01)
NRBC BLD-RTO: 0 PER 100 WBC
PH UR STRIP: 5.5 [PH] (ref 5–8)
PLATELET # BLD AUTO: 386 K/UL (ref 150–400)
PMV BLD AUTO: 9.9 FL (ref 8.9–12.9)
POTASSIUM SERPL-SCNC: 4.1 MMOL/L (ref 3.5–5.1)
PROCALCITONIN SERPL-MCNC: <0.05 NG/ML
PROT SERPL-MCNC: 5.4 G/DL (ref 6.4–8.2)
PROT UR STRIP-MCNC: NEGATIVE MG/DL
RBC # BLD AUTO: 3.76 M/UL (ref 3.8–5.2)
RBC #/AREA URNS HPF: NORMAL /HPF (ref 0–5)
SODIUM SERPL-SCNC: 136 MMOL/L (ref 136–145)
SP GR UR REFRACTOMETRY: 1.01 (ref 1–1.03)
TROPONIN I SERPL-MCNC: 0.14 NG/ML
TSH SERPL DL<=0.05 MIU/L-ACNC: 3.57 UIU/ML (ref 0.36–3.74)
UROBILINOGEN UR QL STRIP.AUTO: 0.2 EU/DL (ref 0.2–1)
WBC # BLD AUTO: 9.3 K/UL (ref 3.6–11)
WBC URNS QL MICRO: NORMAL /HPF (ref 0–4)

## 2021-03-23 PROCEDURE — 84145 PROCALCITONIN (PCT): CPT

## 2021-03-23 PROCEDURE — 99284 EMERGENCY DEPT VISIT MOD MDM: CPT

## 2021-03-23 PROCEDURE — 74011250637 HC RX REV CODE- 250/637: Performed by: EMERGENCY MEDICINE

## 2021-03-23 PROCEDURE — 84484 ASSAY OF TROPONIN QUANT: CPT

## 2021-03-23 PROCEDURE — 71045 X-RAY EXAM CHEST 1 VIEW: CPT

## 2021-03-23 PROCEDURE — 81001 URINALYSIS AUTO W/SCOPE: CPT

## 2021-03-23 PROCEDURE — 85025 COMPLETE CBC W/AUTO DIFF WBC: CPT

## 2021-03-23 PROCEDURE — 36415 COLL VENOUS BLD VENIPUNCTURE: CPT

## 2021-03-23 PROCEDURE — 80053 COMPREHEN METABOLIC PANEL: CPT

## 2021-03-23 PROCEDURE — 84443 ASSAY THYROID STIM HORMONE: CPT

## 2021-03-23 PROCEDURE — 83880 ASSAY OF NATRIURETIC PEPTIDE: CPT

## 2021-03-23 PROCEDURE — 70450 CT HEAD/BRAIN W/O DYE: CPT

## 2021-03-23 PROCEDURE — 93005 ELECTROCARDIOGRAM TRACING: CPT

## 2021-03-23 RX ORDER — ACETAMINOPHEN 325 MG/1
650 TABLET ORAL
Status: COMPLETED | OUTPATIENT
Start: 2021-03-23 | End: 2021-03-23

## 2021-03-23 RX ADMIN — Medication 650 MG: at 14:07

## 2021-03-23 NOTE — ED NOTES
Patient refusing further blood work at this time and appears increasingly agitated. Dr. Christine White made aware.

## 2021-03-23 NOTE — ED PROVIDER NOTES
51-year-old female with a history of breast cancer, dementia, hypothyroidism, and hypertension is coming from [facility] for increased sleepiness over the last several days. She was recently hospitalized after a fall with humeral fracture and some elevated troponins. Cardiology evaluated with a stress test and an echocardiogram and did not give any specific interventions. She has not had any new falls. No fever. No cough. No nausea or vomiting. No reports of any decreased eating. Daughter says when she talks to her mother on FaceTime she notices that she falls asleep very quickly. There is also concerned that her chronically swollen legs are now slightly red and weeping. Patient says that it hurts somewhat. Patient has no complaints and would like to go back to the facility. No new medications. Daughter tells me the patient picks at herself quite a lot and is concerned that she may have been picking at her legs and her chest.  Nurse noticed a small abrasion to the right side of the chest in place a dressing over it. Past Medical History:   Diagnosis Date    Cancer [facility])     beast CA    Endocrine disease     hypothyroid    Hypertension        Past Surgical History:   Procedure Laterality Date    BREAST SURGERY PROCEDURE UNLISTED      right mastectomy         No family history on file.     Social History     Socioeconomic History    Marital status: SINGLE     Spouse name: Not on file    Number of children: Not on file    Years of education: Not on file    Highest education level: Not on file   Occupational History    Not on file   Social Needs    Financial resource strain: Not on file    Food insecurity     Worry: Not on file     Inability: Not on file    Transportation needs     Medical: Not on file     Non-medical: Not on file   Tobacco Use    Smoking status: Former Smoker    Smokeless tobacco: Never Used   Substance and Sexual Activity    Alcohol use: Not Currently    Drug use: Never    Sexual activity: Not on file   Lifestyle    Physical activity     Days per week: Not on file     Minutes per session: Not on file    Stress: Not on file   Relationships    Social connections     Talks on phone: Not on file     Gets together: Not on file     Attends Pentecostalism service: Not on file     Active member of club or organization: Not on file     Attends meetings of clubs or organizations: Not on file     Relationship status: Not on file    Intimate partner violence     Fear of current or ex partner: Not on file     Emotionally abused: Not on file     Physically abused: Not on file     Forced sexual activity: Not on file   Other Topics Concern    Not on file   Social History Narrative    Not on file         ALLERGIES: Sulfa (sulfonamide antibiotics) and Neosporin [hydrocortisone]    Review of Systems   Constitutional: Negative for fever. HENT: Negative for trouble swallowing. Eyes: Negative for visual disturbance. Respiratory: Negative for cough. Cardiovascular: Negative for chest pain. Gastrointestinal: Negative for abdominal pain. Genitourinary: Negative for difficulty urinating. Musculoskeletal: Negative for back pain. Skin: Positive for rash. Neurological: Negative for headaches. Hematological: Does not bruise/bleed easily. Psychiatric/Behavioral: Positive for sleep disturbance. Vitals:    03/23/21 1234 03/23/21 1257   BP: (!) 145/81    Pulse: (!) 57    Resp: 18    Temp: 98.1 °F (36.7 °C)    SpO2: 100% 99%   Weight: 78.3 kg (172 lb 9.6 oz)    Height: 5' 6\" (1.676 m)             Physical Exam  Constitutional:       Appearance: Normal appearance. HENT:      Head: Normocephalic. Nose: Nose normal.      Mouth/Throat:      Mouth: Mucous membranes are moist.   Eyes:      Extraocular Movements: Extraocular movements intact. Conjunctiva/sclera: Conjunctivae normal.   Cardiovascular:      Rate and Rhythm: Normal rate.       Pulses:           Dorsalis pedis pulses are 2+ on the right side and 2+ on the left side. Comments: Legs are cooler than the rest of her body, but capillary refill is normal  No heat to the erythematous areas  Clear weeping fluid without evidence of infection  Pulmonary:      Effort: Pulmonary effort is normal. No respiratory distress. Abdominal:      General: Abdomen is flat. Musculoskeletal: Normal range of motion. Right lower leg: Edema present. Left lower leg: Edema present. Skin:     Findings: Erythema and rash present. Neurological:      General: No focal deficit present. Mental Status: She is alert. Psychiatric:         Behavior: Behavior normal.          MDM  Number of Diagnoses or Management Options  Abnormal chest x-ray  Elevated brain natriuretic peptide (BNP) level  Elevated troponin  Sinus bradycardia  Somnolence  Diagnosis management comments: Patient is a very difficult stick, so even though she has been here 2 hours and 41 minutes, we still do not have blood. We tried multiple nurses and multiple techniques. We may have to perform an arterial blood draw.    ekg 1439  Sinus debra w/ nml axis at 56 bpm  MI, QRS, and QTc wnl  No ST changes    I had an extensive conversation with the patient's daughter about her sleepiness and about how I cannot find an acute medical issue that would explain it. She has evidence of mild interstitial edema on the chest x-ray and elevated BNP and elevated troponin. I spoke with Dr. Marie Paz, cardiology, who reviewed the case with me. She had a pretty extensive cardiac work-up last week when her troponin was also elevated to the same degree. He did not think she would need any further cardiac intervention as an inpatient and suggested outpatient follow-up. I talked about this with the patient and with her daughter and they would prefer to go back to her facility.   Return precautions given should she feel worse or have any chest pain or trouble breathing, which she definitely did not have today.          Procedures

## 2021-03-23 NOTE — ED TRIAGE NOTES
Patient presents to ED via EMS from [facility] for leg weeping and swelling. Facility staff noticed the weeping of BLE this morning. Patient also presents w R arm in sling d/t recent injury. Patient has dementia, HTN, and DNR.

## 2021-03-24 LAB
ATRIAL RATE: 56 BPM
CALCULATED P AXIS, ECG09: 49 DEGREES
CALCULATED R AXIS, ECG10: 38 DEGREES
CALCULATED T AXIS, ECG11: 50 DEGREES
DIAGNOSIS, 93000: NORMAL
P-R INTERVAL, ECG05: 128 MS
Q-T INTERVAL, ECG07: 480 MS
QRS DURATION, ECG06: 94 MS
QTC CALCULATION (BEZET), ECG08: 463 MS
VENTRICULAR RATE, ECG03: 56 BPM

## 2022-03-18 PROBLEM — F03.90 DEMENTIA (HCC): Status: ACTIVE | Noted: 2021-03-15

## 2022-03-18 PROBLEM — R41.0 CONFUSION: Status: ACTIVE | Noted: 2021-03-15

## 2022-03-19 PROBLEM — R77.8 ELEVATED TROPONIN: Status: ACTIVE | Noted: 2021-03-15

## 2022-03-19 PROBLEM — E03.9 ACQUIRED HYPOTHYROIDISM: Status: ACTIVE | Noted: 2021-03-15

## 2022-03-19 PROBLEM — I10 HTN (HYPERTENSION): Status: ACTIVE | Noted: 2021-03-15

## 2022-03-19 PROBLEM — G93.40 ACUTE ENCEPHALOPATHY: Status: ACTIVE | Noted: 2021-03-15

## 2022-03-20 PROBLEM — L03.90 CELLULITIS: Status: ACTIVE | Noted: 2020-07-17

## 2022-03-20 PROBLEM — Z22.322 MRSA (METHICILLIN RESISTANT STAPH AUREUS) CULTURE POSITIVE: Status: ACTIVE | Noted: 2020-07-22

## 2023-02-19 NOTE — ED NOTES
Patient sleeping on stretcher in position of comfort. Side rails elevated. Call bell within reach. Fall risk band and allergy band to wrist. Patient wearing yellow socks. Currently waiting on ambulance transport to [facility]. Last eta from [facility] is 0105. You can access the FollowMyHealth Patient Portal offered by [facility] by registering at the following website: [email or web address]. By joining [employer]’s FollowMyHealth portal, you will also be able to view your health information using other applications (apps) compatible with our system.

## 2023-04-26 NOTE — ED PROVIDER NOTES
25-year-old female with a history of dementia presents from memory care center with chief complaint of altered mental status per EMS. Staff stated that she was not walking or talking as much is normally. Facility also reported that she felt 2 days ago. Fatigue         Past Medical History:   Diagnosis Date    Cancer ([address]     beast CA    Endocrine disease     hypothyroid    Hypertension        Past Surgical History:   Procedure Laterality Date    BREAST SURGERY PROCEDURE UNLISTED      right mastectomy         No family history on file.     Social History     Socioeconomic History    Marital status: SINGLE     Spouse name: Not on file    Number of children: Not on file    Years of education: Not on file    Highest education level: Not on file   Occupational History    Not on file   Social Needs    Financial resource strain: Not on file    Food insecurity     Worry: Not on file     Inability: Not on file    Transportation needs     Medical: Not on file     Non-medical: Not on file   Tobacco Use    Smoking status: Former Smoker    Smokeless tobacco: Never Used   Substance and Sexual Activity    Alcohol use: Not Currently    Drug use: Never    Sexual activity: Not on file   Lifestyle    Physical activity     Days per week: Not on file     Minutes per session: Not on file    Stress: Not on file   Relationships    Social connections     Talks on phone: Not on file     Gets together: Not on file     Attends Yarsani service: Not on file     Active member of club or organization: Not on file     Attends meetings of clubs or organizations: Not on file     Relationship status: Not on file    Intimate partner violence     Fear of current or ex partner: Not on file     Emotionally abused: Not on file     Physically abused: Not on file     Forced sexual activity: Not on file   Other Topics Concern    Not on file   Social History Narrative    Not on file         ALLERGIES: Sulfa (sulfonamide antibiotics) and Neosporin [hydrocortisone]    Review of Systems   Unable to perform ROS: Dementia   Constitutional: Positive for fatigue. Vitals:    03/15/21 1707   Pulse: 89   Resp: 18   Temp: 98.5 °F (36.9 °C)   SpO2: 94%   Weight: 72.6 kg (160 lb 0.8 oz)   Height: 5' 3\" (1.6 m)            Physical Exam  Vitals signs and nursing note reviewed. Constitutional:       General: She is not in acute distress. HENT:      Head: Normocephalic and atraumatic. Mouth/Throat:      Mouth: Mucous membranes are moist.   Eyes:      General: No scleral icterus. Conjunctiva/sclera: Conjunctivae normal.      Pupils: Pupils are equal, round, and reactive to light. Neck:      Musculoskeletal: Neck supple. Trachea: No tracheal deviation. Cardiovascular:      Rate and Rhythm: Normal rate and regular rhythm. Pulmonary:      Effort: Pulmonary effort is normal. No respiratory distress. Breath sounds: No wheezing or rales. Abdominal:      General: There is no distension. Palpations: Abdomen is soft. Tenderness: There is no abdominal tenderness. Genitourinary:     Comments: deferred  Musculoskeletal:         General: No deformity. Skin:     General: Skin is warm and dry. Neurological:      General: No focal deficit present. Mental Status: She is alert. She is confused. Cranial Nerves: Cranial nerves are intact. Motor: Motor function is intact. Gait: Gait is intact. Psychiatric:         Mood and Affect: Mood normal.          [facility]  ED Course as of Mar 15 1752   Mon Mar 15, 2021   1751 EKG shows normal sinus rhythm at rate 86, normal intervals, normal axis, normal ST segments and T waves. [TT]      ED Course User Index  [TT] Tigre Barclay MD       Procedures        Perfect Serve Consult for Admission  9:03 PM    ED Room Number: ER08/08  Patient Name and age:  Noah Wiseman 80 y.o.  female  Working Diagnosis:   1.  Altered mental status, unspecified altered mental status type    2. Elevated troponin    3. Urinary tract infection without hematuria, site unspecified        COVID-19 Suspicion:  no  Sepsis present:  no  Reassessment needed: no  Code Status:  Full Code  Readmission: no  Isolation Requirements:  no  Recommended Level of Care:  tele  Department:[facility] - [phone]  Other: Daughter at bedside states that she is normally less confused and is more lethargic. Troponin mildly elevated. Urine equivocal.         LABORATORY TESTS:  Labs Reviewed   CBC WITH AUTOMATED DIFF - Abnormal; Notable for the following components:       Result Value    HGB 10.8 (*)     LYMPHOCYTES 10 (*)     ABS. MONOCYTES 1.2 (*)     All other components within normal limits   METABOLIC PANEL, COMPREHENSIVE - Abnormal; Notable for the following components:    BUN 35 (*)     BUN/Creatinine ratio 38 (*)     GFR est non-AA 58 (*)     Albumin 3.3 (*)     A-G Ratio 1.0 (*)     All other components within normal limits   URINALYSIS W/MICROSCOPIC - Abnormal; Notable for the following components:    Ketone 15 (*)     Blood TRACE (*)     Leukocyte Esterase TRACE (*)     All other components within normal limits   TROPONIN I - Abnormal; Notable for the following components:    Troponin-I, Qt. 0.08 (*)     All other components within normal limits   URINE CULTURE HOLD SAMPLE   CULTURE, URINE   DRUG SCREEN, URINE       IMAGING RESULTS:  XR CHEST PORT   Final Result   No Acute Disease. CT HEAD WO CONT   Final Result   No apparent acute intracranial finding. MEDICATIONS GIVEN:  Medications   cefTRIAXone (ROCEPHIN) 1 g in sterile water (preservative free) 10 mL IV syringe (has no administration in time range)         IMPRESSION/ PLAN:  1. Altered mental status, unspecified altered mental status type    2. Elevated troponin    3.  Urinary tract infection without hematuria, site unspecified      - admit for observation, trending troponin, treatment of urinary tract infection. Total critical care time spent exclusive of procedures:  0 minutes      Pantera Ha MD soft/nontender/nondistended/normal active bowel sounds details…

## 2023-05-12 RX ORDER — LORAZEPAM 0.5 MG/1
TABLET ORAL EVERY 8 HOURS PRN
COMMUNITY

## 2023-05-12 RX ORDER — GENTAMICIN SULFATE 1 MG/G
OINTMENT TOPICAL 3 TIMES DAILY
COMMUNITY

## 2023-05-12 RX ORDER — SERTRALINE HYDROCHLORIDE 100 MG/1
150 TABLET, FILM COATED ORAL DAILY
COMMUNITY

## 2023-05-12 RX ORDER — LEVOTHYROXINE SODIUM 0.05 MG/1
TABLET ORAL
COMMUNITY

## 2023-05-12 RX ORDER — AMLODIPINE BESYLATE 2.5 MG/1
2.5 TABLET ORAL DAILY
COMMUNITY

## 2023-05-12 RX ORDER — ACETAMINOPHEN 325 MG/1
TABLET ORAL 2 TIMES DAILY PRN
COMMUNITY

## 2023-05-12 RX ORDER — TRIAMCINOLONE ACETONIDE 0.25 MG/G
CREAM TOPICAL EVERY OTHER DAY
COMMUNITY

## 2023-05-12 RX ORDER — RISPERIDONE 0.25 MG/1
TABLET ORAL 2 TIMES DAILY
COMMUNITY